# Patient Record
Sex: MALE | Race: WHITE | NOT HISPANIC OR LATINO | Employment: OTHER | URBAN - METROPOLITAN AREA
[De-identification: names, ages, dates, MRNs, and addresses within clinical notes are randomized per-mention and may not be internally consistent; named-entity substitution may affect disease eponyms.]

---

## 2024-05-29 ENCOUNTER — OFFICE VISIT (OUTPATIENT)
Dept: CARDIOLOGY | Facility: CLINIC | Age: 74
End: 2024-05-29
Payer: MEDICARE

## 2024-05-29 ENCOUNTER — TELEPHONE (OUTPATIENT)
Age: 74
End: 2024-05-29
Payer: MEDICARE

## 2024-05-29 VITALS
WEIGHT: 140 LBS | HEIGHT: 70 IN | BODY MASS INDEX: 20.04 KG/M2 | SYSTOLIC BLOOD PRESSURE: 132 MMHG | OXYGEN SATURATION: 99 % | DIASTOLIC BLOOD PRESSURE: 80 MMHG | HEART RATE: 60 BPM

## 2024-05-29 DIAGNOSIS — R55 SYNCOPE AND COLLAPSE: Primary | ICD-10-CM

## 2024-05-29 DIAGNOSIS — I45.2 BIFASCICULAR BLOCK: ICD-10-CM

## 2024-05-29 DIAGNOSIS — I45.5 SINUS PAUSE: ICD-10-CM

## 2024-05-29 PROCEDURE — 1160F RVW MEDS BY RX/DR IN RCRD: CPT | Performed by: STUDENT IN AN ORGANIZED HEALTH CARE EDUCATION/TRAINING PROGRAM

## 2024-05-29 PROCEDURE — 1159F MED LIST DOCD IN RCRD: CPT | Performed by: STUDENT IN AN ORGANIZED HEALTH CARE EDUCATION/TRAINING PROGRAM

## 2024-05-29 RX ORDER — PHENOL 1.4 %
1 AEROSOL, SPRAY (ML) MUCOUS MEMBRANE DAILY
COMMUNITY

## 2024-05-29 RX ORDER — LITHIUM CARBONATE 300 MG/1
300 TABLET, FILM COATED, EXTENDED RELEASE ORAL DAILY
COMMUNITY
Start: 2024-03-18

## 2024-05-29 RX ORDER — CYANOCOBALAMIN 1000 UG/ML
1000 INJECTION, SOLUTION INTRAMUSCULAR; SUBCUTANEOUS
COMMUNITY
Start: 2023-12-07

## 2024-05-29 RX ORDER — NIFEDIPINE 60 MG/1
60 TABLET, EXTENDED RELEASE ORAL DAILY
COMMUNITY
Start: 2023-12-06

## 2024-05-29 RX ORDER — FERROUS SULFATE 325(65) MG
1 TABLET ORAL
COMMUNITY
Start: 2024-03-28

## 2024-05-29 RX ORDER — TAMSULOSIN HYDROCHLORIDE 0.4 MG/1
0.4 CAPSULE ORAL DAILY
COMMUNITY
Start: 2024-02-06

## 2024-05-29 RX ORDER — METOPROLOL SUCCINATE 25 MG/1
25 TABLET, EXTENDED RELEASE ORAL DAILY
COMMUNITY
Start: 2024-02-06 | End: 2025-02-05

## 2024-05-29 RX ORDER — DESVENLAFAXINE SUCCINATE 50 MG/1
50 TABLET, EXTENDED RELEASE ORAL DAILY
Status: ON HOLD | COMMUNITY
End: 2024-05-30

## 2024-05-29 NOTE — PROGRESS NOTES
Subjective:     Encounter Date:05/29/2024      Patient ID: Victor M Grove is a 73 y.o. male.    Chief Complaint:  Syncope, abnormal Holter monitor    HPI:   73 y.o. male with hypertension and CKD who presents for initial evaluation of syncope and abnormal Holter monitor.  Patient was admitted to [facility] in late April after multiple episodes of syncope.  At that time, there was concern for possible temporal lobe focal epilepsy.  Plan was to transfer to another hospital for EEG monitoring but patient refused.  He underwent echocardiogram during that admission and this was normal with an EF of 55 to 60%.  He was discharged home with a Holter monitor and this revealed occasional PVCs with an 11.6-second pause.  With respect to his symptoms, patient notes daily episodes of dizziness.  He has not had any further episodes of syncope since his admission last month.  With respect to his current medication regimen, he notes that he has been on Toprol and nifedipine for quite some time.    The following portions of the patient's history were reviewed and updated as appropriate: allergies, current medications, past family history, past medical history, past social history, past surgical history and problem list.     REVIEW OF SYSTEMS:   All systems reviewed.  Pertinent positives identified in HPI.  All other systems are negative.    Past Medical History:   Diagnosis Date    Cancer     Hypertension     Kidney failure        Family History   Problem Relation Age of Onset    Hypertension Mother     Hypertension Father     Heart attack Father     Heart attack Brother     Coronary artery disease Brother     Cancer Brother     Diabetes Brother     Aneurysm Paternal Uncle     Cancer Maternal Grandmother        Social History     Socioeconomic History    Marital status:    Tobacco Use    Smoking status: Former     Current packs/day: 0.00     Average packs/day: 1 pack/day for 43.0 years (43.0 ttl pk-yrs)      Types: Cigarettes     Start date: 1971     Quit date: 2014     Years since quitting: 10.4     Passive exposure: Never    Smokeless tobacco: Never   Vaping Use    Vaping status: Never Used   Substance and Sexual Activity    Alcohol use: Not Currently    Drug use: Never       Allergies   Allergen Reactions    Nitroglycerin Other (See Comments)     Other reaction(s): Loss of Consciousness    Other reaction(s): Loss of Consciousness   Other reaction(s): Loss of Consciousness   Other reaction(s): Loss of Consciousness       Past Surgical History:   Procedure Laterality Date    APPENDECTOMY      HERNIA REPAIR      SMALL BOWEL EMBOLIZATION         Procedures       Objective:         Vitals:    05/29/24 1331   BP: 132/80   Pulse: 60   SpO2: 99%       PHYSICAL EXAM:  GEN: well appearing, in NAD   HEENT: NCAT, EOMI, moist mucus membranes   Respiratory: CTAB, no wheezes, rales or rhonchi  CV: normal rate, regular rhythm, normal S1, S2, no murmurs, rubs, gallops, +2 radial pulses b/l  GI: soft, nontender, nondistended  MSK: no edema  Skin: no rash, warm, dry  Heme/Lymph: no bruising or bleeding  Neuro: Alert and Oriented x 3, grossly normal motor function        Assessment:         (R55) Syncope and collapse    (I45.5) Sinus pause    73 y.o. male with hypertension and CKD who presents for initial evaluation of syncope and abnormal Holter monitor.       Plan:       #Syncope/sinus pause  Patient with syncope likely secondary to sinus pauses given his Holter findings as well as his symptoms.  I urged patient and family to go to the ED today with plans for telemonitoring with discontinuation of his Toprol and nifedipine as well as possible pacemaker implantation however patient has refused this multiple times.  I spoke to Dr. Palm who agreed that this would be the best course however as patient has refused, we will instead plan for pacemaker implantation tomorrow as an outpatient.  Patient understands that he will likely require  overnight stay in the hospital and he and his family are agreeable.  He is to discontinue his metoprolol and nifedipine.  He will call me if his blood pressure increases significantly.  He will present to the emergency room if he has another episode of syncope.  He will not drive or operate machinery.  - DC metoprolol and nifedipine  - He will be contacted by our EP office for further details regarding pacemaker plantation tomorrow      VICKIE Alejandro, thank you very much for referring this kind patient to me. Please call me with any questions or concerns. I will see the patient again in the office in 6 weeks or earlier as needed.         Jairo Mclain MD, Merged with [facility], [facility]  05/29/24  [facility]    Outpatient Encounter Medications as of 5/29/2024   Medication Sig Dispense Refill    Cholecalciferol 50 MCG (2000 UT) tablet Take 2 tablets by mouth Daily.      cyanocobalamin 1000 MCG/ML injection Inject 1 mL into the appropriate muscle as directed by prescriber Every 30 (Thirty) Days.      desvenlafaxine (PRISTIQ) 50 MG 24 hr tablet Take 1 tablet by mouth Daily.      ferrous sulfate 325 (65 FE) MG tablet Take 1 tablet by mouth Daily With Breakfast.      lithium (LITHOBID) 300 MG CR tablet Take 1 tablet by mouth Daily.      Melatonin 10 MG tablet Take 1 tablet by mouth Daily.      metoprolol succinate XL (TOPROL-XL) 25 MG 24 hr tablet Take 1 tablet by mouth Daily.      NIFEdipine XL (PROCARDIA XL) 60 MG 24 hr tablet Take 1 tablet by mouth Daily.      tamsulosin (FLOMAX) 0.4 MG capsule 24 hr capsule Take 1 capsule by mouth Daily.       No facility-administered encounter medications on file as of 5/29/2024.

## 2024-05-29 NOTE — TELEPHONE ENCOUNTER
Dr. De La O and I called and spoke with patient and wife--discussed recommendations of pacemaker, he is agreeable to come in tomorrow for procedure--creat 4.5---he currently has no fistula and is not on dialysis    Dr. De La O will see tomorrow prior to procedure and decide on transvenous vs leadless and discuss with patient at that time.     He has been instructed to be NPO after midnight and to be here at 2 pm

## 2024-05-30 ENCOUNTER — HOSPITAL ENCOUNTER (OUTPATIENT)
Facility: HOSPITAL | Age: 74
Discharge: HOME | End: 2024-05-31
Attending: INTERNAL MEDICINE | Admitting: INTERNAL MEDICINE
Payer: MEDICARE

## 2024-05-30 DIAGNOSIS — I45.2 BIFASCICULAR BLOCK: ICD-10-CM

## 2024-05-30 DIAGNOSIS — I45.5 SINUS PAUSE: ICD-10-CM

## 2024-05-30 DIAGNOSIS — R55 SYNCOPE AND COLLAPSE: ICD-10-CM

## 2024-05-30 PROCEDURE — C1894 INTRO/SHEATH, NON-LASER: HCPCS | Performed by: INTERNAL MEDICINE

## 2024-05-30 PROCEDURE — 93010 ELECTROCARDIOGRAM REPORT: CPT | Performed by: INTERNAL MEDICINE

## 2024-05-30 PROCEDURE — 25010000002 HEPARIN (PORCINE) PER 1000 UNITS: Performed by: INTERNAL MEDICINE

## 2024-05-30 PROCEDURE — A9270 NON-COVERED ITEM OR SERVICE: HCPCS | Performed by: PHYSICIAN ASSISTANT

## 2024-05-30 PROCEDURE — 33274 TCAT INSJ/RPL PERM LDLS PM: CPT | Performed by: INTERNAL MEDICINE

## 2024-05-30 PROCEDURE — 63710000001 TAMSULOSIN 0.4 MG CAPSULE: Performed by: PHYSICIAN ASSISTANT

## 2024-05-30 PROCEDURE — 63710000001 POLYETHYLENE GLYCOL 17 G PACK: Performed by: PHYSICIAN ASSISTANT

## 2024-05-30 PROCEDURE — G0378 HOSPITAL OBSERVATION PER HR: HCPCS

## 2024-05-30 PROCEDURE — 93005 ELECTROCARDIOGRAM TRACING: CPT | Performed by: INTERNAL MEDICINE

## 2024-05-30 PROCEDURE — 63710000001 METOPROLOL SUCCINATE XL 25 MG TABLET SUSTAINED-RELEASE 24 HOUR: Performed by: PHYSICIAN ASSISTANT

## 2024-05-30 PROCEDURE — 25010000002 MIDAZOLAM PER 1 MG: Performed by: INTERNAL MEDICINE

## 2024-05-30 PROCEDURE — C1786 PMKR, SINGLE, RATE-RESP: HCPCS | Performed by: INTERNAL MEDICINE

## 2024-05-30 PROCEDURE — C1769 GUIDE WIRE: HCPCS | Performed by: INTERNAL MEDICINE

## 2024-05-30 PROCEDURE — 25010000002 VANCOMYCIN 1 G RECONSTITUTED SOLUTION 1 EACH VIAL: Performed by: INTERNAL MEDICINE

## 2024-05-30 PROCEDURE — 63710000001 NIFEDIPINE XL 60 MG TABLET SUSTAINED-RELEASE 24 HOUR: Performed by: PHYSICIAN ASSISTANT

## 2024-05-30 PROCEDURE — 63710000001 MELATONIN 5 MG TABLET: Performed by: PHYSICIAN ASSISTANT

## 2024-05-30 PROCEDURE — C1893 INTRO/SHEATH, FIXED,NON-PEEL: HCPCS | Performed by: INTERNAL MEDICINE

## 2024-05-30 PROCEDURE — 25810000003 SODIUM CHLORIDE 0.9 % SOLUTION: Performed by: INTERNAL MEDICINE

## 2024-05-30 PROCEDURE — 25810000003 SODIUM CHLORIDE 0.9 % SOLUTION 250 ML FLEX CONT: Performed by: INTERNAL MEDICINE

## 2024-05-30 PROCEDURE — 25010000002 FENTANYL CITRATE (PF) 50 MCG/ML SOLUTION: Performed by: INTERNAL MEDICINE

## 2024-05-30 DEVICE — SYS PACE MICRA LD/LESS AV2: Type: IMPLANTABLE DEVICE | Status: FUNCTIONAL

## 2024-05-30 RX ORDER — ACETAMINOPHEN 325 MG/1
650 TABLET ORAL EVERY 4 HOURS PRN
Status: DISCONTINUED | OUTPATIENT
Start: 2024-05-30 | End: 2024-05-31 | Stop reason: HOSPADM

## 2024-05-30 RX ORDER — UREA 10 %
10 LOTION (ML) TOPICAL NIGHTLY
Status: DISCONTINUED | OUTPATIENT
Start: 2024-05-30 | End: 2024-05-31 | Stop reason: HOSPADM

## 2024-05-30 RX ORDER — SODIUM CHLORIDE 9 MG/ML
40 INJECTION, SOLUTION INTRAVENOUS AS NEEDED
Status: DISCONTINUED | OUTPATIENT
Start: 2024-05-30 | End: 2024-05-31 | Stop reason: HOSPADM

## 2024-05-30 RX ORDER — NIFEDIPINE 60 MG/1
60 TABLET, EXTENDED RELEASE ORAL DAILY
Status: DISCONTINUED | OUTPATIENT
Start: 2024-05-30 | End: 2024-05-31 | Stop reason: HOSPADM

## 2024-05-30 RX ORDER — METOPROLOL SUCCINATE 25 MG/1
25 TABLET, EXTENDED RELEASE ORAL DAILY
Status: DISCONTINUED | OUTPATIENT
Start: 2024-05-30 | End: 2024-05-31 | Stop reason: HOSPADM

## 2024-05-30 RX ORDER — METOPROLOL TARTRATE 1 MG/ML
INJECTION, SOLUTION INTRAVENOUS
Status: DISCONTINUED | OUTPATIENT
Start: 2024-05-30 | End: 2024-05-30 | Stop reason: HOSPADM

## 2024-05-30 RX ORDER — SODIUM CHLORIDE 0.9 % (FLUSH) 0.9 %
10 SYRINGE (ML) INJECTION AS NEEDED
Status: DISCONTINUED | OUTPATIENT
Start: 2024-05-30 | End: 2024-05-30 | Stop reason: HOSPADM

## 2024-05-30 RX ORDER — SODIUM CHLORIDE 9 MG/ML
75 INJECTION, SOLUTION INTRAVENOUS CONTINUOUS
Status: DISCONTINUED | OUTPATIENT
Start: 2024-05-30 | End: 2024-05-31

## 2024-05-30 RX ORDER — LITHIUM CARBONATE 300 MG/1
300 TABLET, FILM COATED, EXTENDED RELEASE ORAL DAILY
Status: DISCONTINUED | OUTPATIENT
Start: 2024-05-30 | End: 2024-05-31 | Stop reason: HOSPADM

## 2024-05-30 RX ORDER — MIDAZOLAM HYDROCHLORIDE 1 MG/ML
INJECTION INTRAMUSCULAR; INTRAVENOUS
Status: DISCONTINUED | OUTPATIENT
Start: 2024-05-30 | End: 2024-05-30 | Stop reason: HOSPADM

## 2024-05-30 RX ORDER — FENTANYL CITRATE 50 UG/ML
INJECTION, SOLUTION INTRAMUSCULAR; INTRAVENOUS
Status: DISCONTINUED | OUTPATIENT
Start: 2024-05-30 | End: 2024-05-30 | Stop reason: HOSPADM

## 2024-05-30 RX ORDER — POLYETHYLENE GLYCOL 3350 17 G/17G
17 POWDER, FOR SOLUTION ORAL DAILY
Status: DISCONTINUED | OUTPATIENT
Start: 2024-05-30 | End: 2024-05-31 | Stop reason: HOSPADM

## 2024-05-30 RX ORDER — SODIUM CHLORIDE 0.9 % (FLUSH) 0.9 %
10 SYRINGE (ML) INJECTION EVERY 12 HOURS SCHEDULED
Status: DISCONTINUED | OUTPATIENT
Start: 2024-05-30 | End: 2024-05-31 | Stop reason: HOSPADM

## 2024-05-30 RX ORDER — SODIUM CHLORIDE 0.9 % (FLUSH) 0.9 %
10 SYRINGE (ML) INJECTION AS NEEDED
Status: DISCONTINUED | OUTPATIENT
Start: 2024-05-30 | End: 2024-05-31 | Stop reason: HOSPADM

## 2024-05-30 RX ORDER — HEPARIN SODIUM 1000 [USP'U]/ML
INJECTION, SOLUTION INTRAVENOUS; SUBCUTANEOUS
Status: DISCONTINUED | OUTPATIENT
Start: 2024-05-30 | End: 2024-05-30 | Stop reason: HOSPADM

## 2024-05-30 RX ORDER — ACETAMINOPHEN 650 MG/1
650 SUPPOSITORY RECTAL EVERY 4 HOURS PRN
Status: DISCONTINUED | OUTPATIENT
Start: 2024-05-30 | End: 2024-05-31 | Stop reason: HOSPADM

## 2024-05-30 RX ORDER — DESVENLAFAXINE SUCCINATE 50 MG/1
50 TABLET, EXTENDED RELEASE ORAL DAILY
Status: DISCONTINUED | OUTPATIENT
Start: 2024-05-30 | End: 2024-05-31 | Stop reason: HOSPADM

## 2024-05-30 RX ORDER — TAMSULOSIN HYDROCHLORIDE 0.4 MG/1
0.4 CAPSULE ORAL DAILY
Status: DISCONTINUED | OUTPATIENT
Start: 2024-05-30 | End: 2024-05-31 | Stop reason: HOSPADM

## 2024-05-30 RX ORDER — SODIUM CHLORIDE 0.9 % (FLUSH) 0.9 %
10 SYRINGE (ML) INJECTION EVERY 12 HOURS SCHEDULED
Status: DISCONTINUED | OUTPATIENT
Start: 2024-05-30 | End: 2024-05-30 | Stop reason: HOSPADM

## 2024-05-30 RX ORDER — METHOHEXITAL IN WATER/PF 100MG/10ML
SYRINGE (ML) INTRAVENOUS
Status: DISCONTINUED | OUTPATIENT
Start: 2024-05-30 | End: 2024-05-30 | Stop reason: HOSPADM

## 2024-05-30 RX ADMIN — SODIUM CHLORIDE 75 ML/HR: 9 INJECTION, SOLUTION INTRAVENOUS at 14:36

## 2024-05-30 RX ADMIN — SODIUM CHLORIDE 1000 MG: 0.9 INJECTION, SOLUTION INTRAVENOUS at 14:36

## 2024-05-30 RX ADMIN — POLYETHYLENE GLYCOL 3350 17 G: 17 POWDER, FOR SOLUTION ORAL at 18:43

## 2024-05-30 RX ADMIN — METOPROLOL SUCCINATE 25 MG: 25 TABLET, EXTENDED RELEASE ORAL at 18:34

## 2024-05-30 RX ADMIN — Medication 10 MG: at 21:50

## 2024-05-30 RX ADMIN — TAMSULOSIN HYDROCHLORIDE 0.4 MG: 0.4 CAPSULE ORAL at 18:34

## 2024-05-30 RX ADMIN — Medication 10 ML: at 21:50

## 2024-05-30 RX ADMIN — NIFEDIPINE 60 MG: 60 TABLET, FILM COATED, EXTENDED RELEASE ORAL at 18:35

## 2024-05-30 NOTE — Clinical Note
Hemostasis started on the right femoral vein. Figure 8 suturing was used in achieving hemostasis. Closure device deployed in the vessel. Hemostasis achieved successfully. Closure device additional comment: Sheath removed by M D with figure of 8 stitch and stopcock bolster.

## 2024-05-30 NOTE — DISCHARGE INSTRUCTIONS
Micra Pacemaker Femoral Approach, Adult, Post Care Instructions    This sheet gives you information about how to care for yourself after your procedure. Your health care provider may also give you more specific instructions. If you have problems or questions, contact your health care provider.  What can I expect after the procedure?  After the procedure, it is common to have:  Mild pain.  Slight bruising at your groin site. Minor discomfort or tenderness and a small bump at the catheter insertion site. The bump should usually decrease in size and tenderness within 1 to 2 weeks.  Any bruising will usually fade within 2 to 4 weeks.  Follow these instructions at home:  Home Care Instructions:  Do not apply powder or lotion to the site.  Do not take baths, swim, or use a hot tub until your health care provider approves and the site is completely healed.  Do not bend, squat, or lift anything over 20 lb (9 kg) or as directed by your health care provider. However, we recommend lifting nothing heavier than a gallon of milk.    You may shower 24 hours after the procedure. Remove the bandage (dressing) and gently wash the site with plain soap and water. Gently pat the site dry. You may apply a band aid daily for 2 days if desired.    Inspect the site at least twice daily.  Limit your activity for the first 48 hours. .    Avoid strenuous activity for 1 week or as advised by your physician.    Follow instructions about when you can drive or return to work as directed by your physician.    Hold direct pressure over the site when you cough, sneeze, laugh or change positions.  Do this for the next 2 days.    Do not operate machinery or power tools for 24 hours.  A responsible adult should be with you for the first 24 hours after you arrive home. Do not make any important legal decisions or sign legal papers for 24 hours.  Do not drink alcohol for 24 hours.    Electricity and magnetic fields  Avoid places or objects that have a  strong electric or magnetic field, including:  Airport security checkpoints. When at the airport, let officials know that you have a pacemaker. Carry your pacemaker ID card.  Metal detectors. If you must pass through a metal detector, walk through it quickly. Do not stop under the detector or stand near it.  Power plants.  Large electrical generators.  Radiofrequency transmission towers, such as mobile phone and radio towers.  Do not use amateur radio equipment or electric welding torches. If you are unsure of whether something is safe to use, ask your health care provider. Some devices may be safe to use if you hold them at least 1 ft (0.3 m) from your pacemaker. These devices may include power tools, lawn mowers, and speakers.  Long-term care  You may be shown how to transfer data from your pacemaker through the phone to your health care provider.  Always let all health care providers, including dentists, know about your pacemaker before you have any medical procedures or tests.  Wear a medical ID bracelet or necklace stating that you have a pacemaker.  Carry a pacemaker ID card with you at all times.  Your pacemaker battery will last for 5-15 years. Your health care provider will do routine checks to know when the battery is starting to run down. When this happens, the pacemaker will need to be replaced.          Call Your Doctor If:  You have heavy bleeding from the site. If this happens, hold pressure on the site and call 911.  You have drainage (other than a small amount of blood on the dressing).  You have chills or a fever > 101.  You have redness, warmth, swelling(larger than a walnut), or pain at the insertion site  You develop chest pain or shortness of breath, feel faint, or pass out.  You develop pain, discoloration, coldness, numbness, tingling, or severe bruising in the leg that held the catheter.    You gain weight suddenly.  Your legs or feet swell.  It feels like your heart is fluttering or  skipping beats (you have heart palpitations).  You have any of these signs of infection:  More redness, swelling, or pain around an incision.  Fluid or blood coming from an incision.  Warmth coming from an incision.  Pus or a bad smell coming from an incision.  A fever or chills.       You have any symptoms of a stroke.  Remember BE FAST  B-balance. Signs are dizziness, sudden trouble walking or loss of balance.  E-eyes.  Signs are trouble seeing or a sudden change in vision.   F-face. Signs are sudden weakness or numbness of the face, or the face or eyelid drooping on one side.  A-arms Signs are weakness or numbness in an arm.  This happens suddenly and usually on one side of the body.  S-speech.  Signs are sudden trouble speaking, slurred speech, or trouble understanding what people say.  T-time. Time to call emergency services.  Write down the symptoms and the time they started.   Other signs of stroke may include:  A sudden, severe headache with no known cause  Nausea or vomiting  Seizure           Medicines  Take over-the-counter and prescription medicines only as told by your health care provider.  If you were prescribed an antibiotic medicine, take it as told by your health care provider. Do not stop taking the antibiotic even if you start to feel better.  If you take Aspirin or blood thinner, be sure to talk to your doctor about when to resume this medication.  Ask your health care provider if the medicine prescribed to you requires you to avoid driving or using machinery.    General instructions  Do not use any products that contain nicotine or tobacco, such as cigarettes, e-cigarettes, and chewing tobacco. These can delay incision healing after surgery. If you need help quitting, ask your health care provider.  Follow instructions from your health care provider about eating or drinking restrictions.  Weigh yourself every day. If you suddenly gain weight, fluid may be building up in your body.  Keep all  follow-up visits as told by your health care provider. This is important. During follow-up visits, your pacemaker will be checked and reprogrammed if necessary.  Get help right away if:  You have chest pain.  You have trouble breathing or are short of breath.  You become extremely tired.  You are light-headed or you faint.  These symptoms may represent a serious problem that is an emergency. Do not wait to see if the symptoms will go away. Get medical help right away. Call your local emergency services (911 in the [place] Do not drive yourself to the hospital.

## 2024-05-30 NOTE — Clinical Note
Prepped: groin. Prepped with: ChloraPrep. The site was clipped. The patient was draped in a sterile fashion. Groins and chest shaved in preop.

## 2024-05-30 NOTE — Clinical Note
A sheath was successfully inserted using micropuncture technique with ultrasound guidance into the right femoral vein.

## 2024-05-31 VITALS
OXYGEN SATURATION: 100 % | TEMPERATURE: 98.2 F | HEIGHT: 70 IN | DIASTOLIC BLOOD PRESSURE: 72 MMHG | BODY MASS INDEX: 21.47 KG/M2 | HEART RATE: 68 BPM | SYSTOLIC BLOOD PRESSURE: 111 MMHG | WEIGHT: 150 LBS | RESPIRATION RATE: 16 BRPM

## 2024-05-31 PROBLEM — R55 SYNCOPE: Status: ACTIVE | Noted: 2024-05-31

## 2024-05-31 LAB
ANION GAP SERPL CALCULATED.3IONS-SCNC: 11 MMOL/L (ref 5–15)
BUN SERPL-MCNC: 57 MG/DL (ref 8–23)
BUN/CREAT SERPL: 13.9 (ref 7–25)
CALCIUM SPEC-SCNC: 9.1 MG/DL (ref 8.6–10.5)
CHLORIDE SERPL-SCNC: 113 MMOL/L (ref 98–107)
CO2 SERPL-SCNC: 21 MMOL/L (ref 22–29)
CREAT SERPL-MCNC: 4.09 MG/DL (ref 0.76–1.27)
DEPRECATED RDW RBC AUTO: 46.9 FL (ref 37–54)
EGFRCR SERPLBLD CKD-EPI 2021: 14.8 ML/MIN/1.73
ERYTHROCYTE [DISTWIDTH] IN BLOOD BY AUTOMATED COUNT: 14.4 % (ref 12.3–15.4)
GLUCOSE SERPL-MCNC: 160 MG/DL (ref 65–99)
HCT VFR BLD AUTO: 35.3 % (ref 37.5–51)
HGB BLD-MCNC: 11.5 G/DL (ref 13–17.7)
MCH RBC QN AUTO: 29 PG (ref 26.6–33)
MCHC RBC AUTO-ENTMCNC: 32.6 G/DL (ref 31.5–35.7)
MCV RBC AUTO: 89.1 FL (ref 79–97)
PLATELET # BLD AUTO: 242 10*3/MM3 (ref 140–450)
PMV BLD AUTO: 9.3 FL (ref 6–12)
POTASSIUM SERPL-SCNC: 4 MMOL/L (ref 3.5–5.2)
QT INTERVAL: 461 MS
QTC INTERVAL: 465 MS
RBC # BLD AUTO: 3.96 10*6/MM3 (ref 4.14–5.8)
SODIUM SERPL-SCNC: 145 MMOL/L (ref 136–145)
WBC NRBC COR # BLD AUTO: 8.86 10*3/MM3 (ref 3.4–10.8)

## 2024-05-31 PROCEDURE — 63710000001 TAMSULOSIN 0.4 MG CAPSULE: Performed by: PHYSICIAN ASSISTANT

## 2024-05-31 PROCEDURE — 93010 ELECTROCARDIOGRAM REPORT: CPT | Performed by: INTERNAL MEDICINE

## 2024-05-31 PROCEDURE — 80048 BASIC METABOLIC PNL TOTAL CA: CPT | Performed by: NURSE PRACTITIONER

## 2024-05-31 PROCEDURE — A9270 NON-COVERED ITEM OR SERVICE: HCPCS | Performed by: PHYSICIAN ASSISTANT

## 2024-05-31 PROCEDURE — G0378 HOSPITAL OBSERVATION PER HR: HCPCS

## 2024-05-31 PROCEDURE — 63710000001 METOPROLOL SUCCINATE XL 25 MG TABLET SUSTAINED-RELEASE 24 HOUR: Performed by: PHYSICIAN ASSISTANT

## 2024-05-31 PROCEDURE — 85027 COMPLETE CBC AUTOMATED: CPT | Performed by: NURSE PRACTITIONER

## 2024-05-31 PROCEDURE — 63710000001 LITHIUM 300 MG TABLET CONTROLLED-RELEASE: Performed by: PHYSICIAN ASSISTANT

## 2024-05-31 PROCEDURE — 99238 HOSP IP/OBS DSCHRG MGMT 30/<: CPT | Performed by: NURSE PRACTITIONER

## 2024-05-31 PROCEDURE — 63710000001 NIFEDIPINE XL 60 MG TABLET SUSTAINED-RELEASE 24 HOUR: Performed by: PHYSICIAN ASSISTANT

## 2024-05-31 PROCEDURE — 93005 ELECTROCARDIOGRAM TRACING: CPT | Performed by: NURSE PRACTITIONER

## 2024-05-31 RX ADMIN — METOPROLOL SUCCINATE 25 MG: 25 TABLET, EXTENDED RELEASE ORAL at 09:49

## 2024-05-31 RX ADMIN — TAMSULOSIN HYDROCHLORIDE 0.4 MG: 0.4 CAPSULE ORAL at 09:49

## 2024-05-31 RX ADMIN — Medication 10 ML: at 09:49

## 2024-05-31 RX ADMIN — LITHIUM CARBONATE 300 MG: 300 TABLET, FILM COATED, EXTENDED RELEASE ORAL at 09:49

## 2024-05-31 RX ADMIN — NIFEDIPINE 60 MG: 60 TABLET, FILM COATED, EXTENDED RELEASE ORAL at 09:49

## 2024-05-31 NOTE — CASE MANAGEMENT/SOCIAL WORK
Discharge Planning Assessment  [facility]     Patient Name: Victor M Grove  MRN: [id number]  Today's Date: 5/31/2024    Admit Date: 5/30/2024    Plan: Home w/ spouse   Discharge Needs Assessment       Row Name 05/31/24 1412       Living Environment    People in Home spouse    Name(s) of People in Home Narcisa Grove/spouse    Current Living Arrangements home    Potentially Unsafe Housing Conditions none    In the past 12 months has the electric, gas, oil, or water company threatened to shut off services in your home? No    Primary Care Provided by self    Provides Primary Care For no one    Family Caregiver if Needed spouse    Quality of Family Relationships supportive;involved    Able to Return to Prior Arrangements yes       Resource/Environmental Concerns    Resource/Environmental Concerns none    Transportation Concerns none       Transportation Needs    In the past 12 months, has lack of transportation kept you from medical appointments or from getting medications? no    In the past 12 months, has lack of transportation kept you from meetings, work, or from getting things needed for daily living? No       Food Insecurity    Within the past 12 months, you worried that your food would run out before you got the money to buy more. Never true       Transition Planning    Patient/Family Anticipates Transition to home with family    Patient/Family Anticipated Services at Transition none    Transportation Anticipated family or friend will provide       Discharge Needs Assessment    Readmission Within the Last 30 Days no previous admission in last 30 days    Equipment Currently Used at Home bp cuff    Concerns to be Addressed no discharge needs identified;denies needs/concerns at this time    Anticipated Changes Related to Illness none    Equipment Needed After Discharge none    Provided Post Acute Provider List? N/A    N/A Provider List Comment no need for list identified at this time                   Discharge  Plan       Row Name 05/31/24 1413       Plan    Plan Home w/ spouse    Plan Comments CCP spoke with patient and spouse/Narcisa Grove at bedside.  CCP role explained and discharge planning discussed.  Face sheet verified.  Patient stated he is IADL's, retired and no longer drives much.  Patient lives with spouse in a single-story home with three entrance stair steps.  Patients PCP confirmed as, ePdro Robles.  Patient's pharmacy confirmed as, [facility] in [place], [place].  Patient has the following DME- BP cuff.  Patient has used home health back in 2014 but cannot recall the agency name.  Pt denies past sub-acute rehab stay.  Patient plans to return home at discharge and wife will transport.  Patient denies current discharge needs.  CCP will continue to follow…….Desiree GARNICA /TIGRE.                  Continued Care and Services - Admitted Since 5/30/2024    No active coordination exists for this encounter.       Expected Discharge Date and Time       Expected Discharge Date Expected Discharge Time    May 31, 2024            Demographic Summary       Row Name 05/31/24 1410       General Information    Admission Type same day    Arrived From home    Referral Source admission list    Reason for Consult discharge planning    Preferred Language English       Contact Information    Permission Granted to Share Info With family/designee                   Functional Status       Row Name 05/31/24 1411       Functional Status    Usual Activity Tolerance moderate    Current Activity Tolerance moderate       Assessment of Health Literacy    How often do you have someone help you read hospital materials? Never    Health Literacy Good       Functional Status, IADL    Medications independent    Meal Preparation independent    Housekeeping independent    Laundry independent    Shopping assistive person       Mental Status    General Appearance WDL WDL       Mental Status Summary    Recent Changes in Mental Status/Cognitive  Functioning no changes    Mental Status Comments flat affect       Employment/    Employment Status retired                   Psychosocial    No documentation.                  Abuse/Neglect    No documentation.                  Legal    No documentation.                  Substance Abuse    No documentation.                  Patient Forms    No documentation.                     Desiree Merino RN

## 2024-05-31 NOTE — DISCHARGE SUMMARY
DISCHARGE NOTE    Patient Name: Victor M Grove  Age/Sex: 73 y.o. male  : 1950  MRN: [id number]    Date of Discharge:  2024   Date of Admit: 2024  Encounter Provider: VICKIE Estrella  Place of Service: [facility] CARDIOLOGY  Patient Care Team:  Pedro Robles APRN as PCP - General (Family Medicine)    Subjective:     Discharge Diagnosis:    Syncope    Syncope and collapse    Sinus pause    Bifascicular block      Hospital Course:     73 yr old who saw Dr. Aldrich in the office for the first time  for recent episodes of dizziness and syncope.     He had syncope in April and was hospitalized at [facility] in [place]---seen by cardiology, had echo which showed normal EF and sent home with a monitor which showed pauses, longest almost 12 seconds in duration.     He saw Dr. Aldrich on , he recommended admission to hospital that day given his ongoing complaints of dizziness and near syncope and abnormal monitoring findings so he could be seen by EP with plans likely for pacemaker the following day. He adamantly refused to be admitted but was agreeable to coming in for PPM .     Dr. De La O and I called and spoke with him about the procedure.     He presented electively , he has significant CKD stage IV --recent creat 4.5, he underwent implantation of Micra leadless pacemaker.    Tolerated procedure well. He  was ready to go home this morning but had not been up out of the bed.    He got up to the bathroom and sneezed multiple times and he has some bleeding from his leg, got back to bed, pressure held by RN and bleeding stopped---no hematoma, new dressing placed. Bedrest for 1 hour, the HOB up and then up to chair.     Monitored until this afternoon, he has been up in the chair and ambulated twice. He reports feeling tired and weak. B/p is okay,  pacemaker checked by rep this morning, normal testing and function.     Checked labs this morning, drawn after his episode of bleeding from his right groin---H/H stable, actually improved from recent labs---11.5/35.3, creat improved a little as well, down to 4.09.     Instructed on holding pressure to right groin if sneeze, cough, lifting, etc. He was able to demonstrate technique back without any issues.     Stable for dc home, incision/pacemaker check in 1 week.     Vital Signs  Temp:  [97.2 °F (36.2 °C)-98.2 °F (36.8 °C)] 98.2 °F (36.8 °C)  Heart Rate:  [49-74] 68  Resp:  [3-19] 16  BP: (111-211)/() 111/72    Intake/Output Summary (Last 24 hours) at 5/31/2024 1451  Last data filed at 5/31/2024 1300  Gross per 24 hour   Intake 1210 ml   Output 760 ml   Net 450 ml       Physical Exam:    General Appearance: No acute distress  Respiratory: No signs of respiratory distress. Respiration rhythm and depth normal.   Cardiovascular:  Heart Rate and Rhythm: Normal   Lower Extremities: No edema noted.  Musculoskeletal: Normal movement of extremities  Skin: Warm and dry.   Psychiatric: Patient alert and oriented to person, place, and time. Flat affect.     Labs:   Results from last 7 days   Lab Units 05/31/24  0838   SODIUM mmol/L 145   POTASSIUM mmol/L 4.0   CHLORIDE mmol/L 113*   CO2 mmol/L 21.0*   BUN mg/dL 57*   CREATININE mg/dL 4.09*   GLUCOSE mg/dL 160*   CALCIUM mg/dL 9.1         Results from last 7 days   Lab Units 05/31/24  0838   WBC 10*3/mm3 8.86   HEMOGLOBIN g/dL 11.5*   HEMATOCRIT % 35.3*   PLATELETS 10*3/mm3 242         Discharge Diet:    Dietary Orders (From admission, onward)       Start     Ordered    05/30/24 1737  Diet: Regular/House; Fluid Consistency: Thin (IDDSI 0)  Diet Effective Now        References:    Diet Order [facility]   Question Answer Comment   Diets: Regular/House    Fluid Consistency: Thin (IDDSI 0)        05/30/24 1736                    Activity at Discharge:  Instructions given to  patient.     Discharge Medications     Discharge Medications        Continue These Medications        Instructions Start Date   Cholecalciferol 50 MCG (2000 UT) tablet   4,000 Units, Oral, Daily      cyanocobalamin 1000 MCG/ML injection   1,000 mcg, Intramuscular, Every 30 Days      ferrous sulfate 325 (65 FE) MG tablet   1 tablet, Oral, Daily With Breakfast      IRON PO   Oral      lithium 300 MG CR tablet  Commonly known as: LITHOBID   300 mg, Oral, Daily      Melatonin 10 MG tablet   1 tablet, Oral, Daily      metoprolol succinate XL 25 MG 24 hr tablet  Commonly known as: TOPROL-XL   25 mg, Oral, Daily      MIRALAX PO   Oral      NIFEdipine XL 60 MG 24 hr tablet  Commonly known as: PROCARDIA XL   60 mg, Oral, Daily      tamsulosin 0.4 MG capsule 24 hr capsule  Commonly known as: FLOMAX   0.4 mg, Oral, Daily               Discharge disposition: home    Follow-up Appointments   Follow-up Information       [facility] Follow up in 1 week(s).    Why: for device interrogaiton  Contact information:  [address]  [place], [address]             Leticia De Jesus APRN Follow up in 6 week(s).    Specialty: Cardiology  Why: 6-8 weeks with pacemaker check  Contact information:  [address]  [address]  [place] [address]  [phone]               Pedro Robles APRN .    Specialty: Family Medicine  Contact information:  [address]  [place] [address] [phone]                           Future Appointments   Date Time Provider Department Center   6/7/2024 10:30 AM [facility] [place] DEVICE CHECK [id number] None         VICKIE Estrelal  05/31/24  14:51 EDT

## 2024-06-03 LAB
QT INTERVAL: 474 MS
QTC INTERVAL: 489 MS

## 2024-06-07 ENCOUNTER — CLINICAL SUPPORT NO REQUIREMENTS (OUTPATIENT)
Age: 74
End: 2024-06-07
Payer: MEDICARE

## 2024-06-07 DIAGNOSIS — I45.5 SINUS PAUSE: Primary | ICD-10-CM

## 2024-06-07 DIAGNOSIS — I45.2 BIFASCICULAR BLOCK: ICD-10-CM

## 2024-06-21 ENCOUNTER — CLINICAL SUPPORT NO REQUIREMENTS (OUTPATIENT)
Age: 74
End: 2024-06-21
Payer: MEDICARE

## 2024-06-21 DIAGNOSIS — I45.5 SINUS PAUSE: ICD-10-CM

## 2024-06-21 DIAGNOSIS — I44.2 AV BLOCK, COMPLETE: Primary | ICD-10-CM

## 2024-07-20 ENCOUNTER — APPOINTMENT (OUTPATIENT)
Dept: CT IMAGING | Facility: HOSPITAL | Age: 74
End: 2024-07-20
Payer: MEDICARE

## 2024-07-20 ENCOUNTER — HOSPITAL ENCOUNTER (EMERGENCY)
Facility: HOSPITAL | Age: 74
Discharge: HOME | End: 2024-07-20
Attending: EMERGENCY MEDICINE
Payer: MEDICARE

## 2024-07-20 VITALS
OXYGEN SATURATION: 99 % | HEIGHT: 70 IN | TEMPERATURE: 99.3 F | DIASTOLIC BLOOD PRESSURE: 88 MMHG | RESPIRATION RATE: 17 BRPM | BODY MASS INDEX: 20.29 KG/M2 | SYSTOLIC BLOOD PRESSURE: 137 MMHG | HEART RATE: 86 BPM | WEIGHT: 141.76 LBS

## 2024-07-20 DIAGNOSIS — K59.00 CONSTIPATION, UNSPECIFIED CONSTIPATION TYPE: ICD-10-CM

## 2024-07-20 DIAGNOSIS — R33.8 ACUTE URINARY RETENTION: Primary | ICD-10-CM

## 2024-07-20 LAB
ALBUMIN SERPL-MCNC: 4.5 G/DL (ref 3.5–5.2)
ALBUMIN/GLOB SERPL: 1.6 G/DL
ALP SERPL-CCNC: 55 U/L (ref 39–117)
ALT SERPL W P-5'-P-CCNC: 7 U/L (ref 1–41)
ANION GAP SERPL CALCULATED.3IONS-SCNC: 12.8 MMOL/L (ref 5–15)
AST SERPL-CCNC: 13 U/L (ref 1–40)
BACTERIA UR QL AUTO: NORMAL /HPF
BASOPHILS # BLD AUTO: 0.02 10*3/MM3 (ref 0–0.2)
BASOPHILS NFR BLD AUTO: 0.2 % (ref 0–1.5)
BILIRUB SERPL-MCNC: 0.3 MG/DL (ref 0–1.2)
BILIRUB UR QL STRIP: NEGATIVE
BUN SERPL-MCNC: 62 MG/DL (ref 8–23)
BUN/CREAT SERPL: 12.8 (ref 7–25)
CALCIUM SPEC-SCNC: 10.3 MG/DL (ref 8.6–10.5)
CHLORIDE SERPL-SCNC: 107 MMOL/L (ref 98–107)
CLARITY UR: CLEAR
CO2 SERPL-SCNC: 19.2 MMOL/L (ref 22–29)
COLOR UR: YELLOW
CREAT SERPL-MCNC: 4.86 MG/DL (ref 0.76–1.27)
D-LACTATE SERPL-SCNC: 1.5 MMOL/L (ref 0.5–2)
DEPRECATED RDW RBC AUTO: 48 FL (ref 37–54)
EGFRCR SERPLBLD CKD-EPI 2021: 12 ML/MIN/1.73
EOSINOPHIL # BLD AUTO: 0 10*3/MM3 (ref 0–0.4)
EOSINOPHIL NFR BLD AUTO: 0 % (ref 0.3–6.2)
ERYTHROCYTE [DISTWIDTH] IN BLOOD BY AUTOMATED COUNT: 14.6 % (ref 12.3–15.4)
GLOBULIN UR ELPH-MCNC: 2.9 GM/DL
GLUCOSE SERPL-MCNC: 153 MG/DL (ref 65–99)
GLUCOSE UR STRIP-MCNC: NEGATIVE MG/DL
HCT VFR BLD AUTO: 33.8 % (ref 37.5–51)
HGB BLD-MCNC: 10.7 G/DL (ref 13–17.7)
HGB UR QL STRIP.AUTO: NEGATIVE
HOLD SPECIMEN: NORMAL
HOLD SPECIMEN: NORMAL
HYALINE CASTS UR QL AUTO: NORMAL /LPF
IMM GRANULOCYTES # BLD AUTO: 0.06 10*3/MM3 (ref 0–0.05)
IMM GRANULOCYTES NFR BLD AUTO: 0.5 % (ref 0–0.5)
KETONES UR QL STRIP: NEGATIVE
LEUKOCYTE ESTERASE UR QL STRIP.AUTO: NEGATIVE
LIPASE SERPL-CCNC: 32 U/L (ref 13–60)
LYMPHOCYTES # BLD AUTO: 0.36 10*3/MM3 (ref 0.7–3.1)
LYMPHOCYTES NFR BLD AUTO: 2.9 % (ref 19.6–45.3)
MCH RBC QN AUTO: 28.3 PG (ref 26.6–33)
MCHC RBC AUTO-ENTMCNC: 31.7 G/DL (ref 31.5–35.7)
MCV RBC AUTO: 89.4 FL (ref 79–97)
MONOCYTES # BLD AUTO: 0.44 10*3/MM3 (ref 0.1–0.9)
MONOCYTES NFR BLD AUTO: 3.5 % (ref 5–12)
NEUTROPHILS NFR BLD AUTO: 11.65 10*3/MM3 (ref 1.7–7)
NEUTROPHILS NFR BLD AUTO: 92.9 % (ref 42.7–76)
NITRITE UR QL STRIP: NEGATIVE
NRBC BLD AUTO-RTO: 0 /100 WBC (ref 0–0.2)
PH UR STRIP.AUTO: 6.5 [PH] (ref 5–8)
PLATELET # BLD AUTO: 345 10*3/MM3 (ref 140–450)
PMV BLD AUTO: 9.2 FL (ref 6–12)
POTASSIUM SERPL-SCNC: 4.6 MMOL/L (ref 3.5–5.2)
PROT SERPL-MCNC: 7.4 G/DL (ref 6–8.5)
PROT UR QL STRIP: ABNORMAL
RBC # BLD AUTO: 3.78 10*6/MM3 (ref 4.14–5.8)
RBC # UR STRIP: NORMAL /HPF
REF LAB TEST METHOD: NORMAL
SODIUM SERPL-SCNC: 139 MMOL/L (ref 136–145)
SP GR UR STRIP: 1.01 (ref 1–1.03)
SQUAMOUS #/AREA URNS HPF: NORMAL /HPF
UROBILINOGEN UR QL STRIP: ABNORMAL
WBC # UR STRIP: NORMAL /HPF
WBC NRBC COR # BLD AUTO: 12.53 10*3/MM3 (ref 3.4–10.8)
WHOLE BLOOD HOLD COAG: NORMAL
WHOLE BLOOD HOLD SPECIMEN: NORMAL

## 2024-07-20 PROCEDURE — 85025 COMPLETE CBC W/AUTO DIFF WBC: CPT

## 2024-07-20 PROCEDURE — 99284 EMERGENCY DEPT VISIT MOD MDM: CPT

## 2024-07-20 PROCEDURE — 81001 URINALYSIS AUTO W/SCOPE: CPT | Performed by: EMERGENCY MEDICINE

## 2024-07-20 PROCEDURE — 80053 COMPREHEN METABOLIC PANEL: CPT

## 2024-07-20 PROCEDURE — 51798 US URINE CAPACITY MEASURE: CPT

## 2024-07-20 PROCEDURE — 96374 THER/PROPH/DIAG INJ IV PUSH: CPT

## 2024-07-20 PROCEDURE — 83605 ASSAY OF LACTIC ACID: CPT

## 2024-07-20 PROCEDURE — 83690 ASSAY OF LIPASE: CPT

## 2024-07-20 PROCEDURE — 51702 INSERT TEMP BLADDER CATH: CPT

## 2024-07-20 PROCEDURE — 74176 CT ABD & PELVIS W/O CONTRAST: CPT

## 2024-07-20 PROCEDURE — 25010000002 MORPHINE PER 10 MG: Performed by: EMERGENCY MEDICINE

## 2024-07-20 PROCEDURE — 36415 COLL VENOUS BLD VENIPUNCTURE: CPT

## 2024-07-20 RX ORDER — SODIUM CHLORIDE 0.9 % (FLUSH) 0.9 %
10 SYRINGE (ML) INJECTION AS NEEDED
Status: DISCONTINUED | OUTPATIENT
Start: 2024-07-20 | End: 2024-07-20 | Stop reason: HOSPADM

## 2024-07-20 RX ORDER — MORPHINE SULFATE 2 MG/ML
2 INJECTION, SOLUTION INTRAMUSCULAR; INTRAVENOUS ONCE
Status: COMPLETED | OUTPATIENT
Start: 2024-07-20 | End: 2024-07-20

## 2024-07-20 RX ADMIN — MORPHINE SULFATE 2 MG: 2 INJECTION, SOLUTION INTRAMUSCULAR; INTRAVENOUS at 15:10

## 2024-07-20 NOTE — ED PROVIDER NOTES
Time: 2:19 PM EDT  Date of encounter:  7/20/2024  Independent Historian/Clinical History and Information was obtained by:   Patient and Family    History is limited by: N/A    Chief Complaint: Abdominal pain      History of Present Illness:  Patient is a 73 y.o. year old male who presents to the emergency department for evaluation of abdominal pain.  Patient reports constipation and urinary retention for at least 1 to 2 days.  Does also state a history of stage IV CKD but is not on dialysis.  Reportedly still makes urine.    HPI    Patient Care Team  Primary Care Provider: Pedro Robles APRN    Past Medical History:     Allergies   Allergen Reactions    Nitroglycerin Other (See Comments)     Other reaction(s): Loss of Consciousness    Other reaction(s): Loss of Consciousness   Other reaction(s): Loss of Consciousness   Other reaction(s): Loss of Consciousness     Past Medical History:   Diagnosis Date    Cancer     Squamous cell skin    Cancer of kidney     Hypertension     Kidney failure      Past Surgical History:   Procedure Laterality Date    APPENDECTOMY      HERNIA REPAIR      NEPHRECTOMY Left 2014    PACEMAKER IMPLANTATION N/A 5/30/2024    Procedure: Implant or Replacement of Single Chamber Leadless Pacemaker, RV Only;  Surgeon: Ramirez De La O MD;  Location: [facility] INVASIVE LOCATION;  Service: Cardiovascular;  Laterality: N/A;    SMALL BOWEL EMBOLIZATION      TURP / TRANSURETHRAL INCISION / DRAINAGE PROSTATE       Family History   Problem Relation Age of Onset    Hypertension Mother     Hypertension Father     Heart attack Father     Heart attack Brother     Coronary artery disease Brother     Cancer Brother     Diabetes Brother     Aneurysm Paternal Uncle     Cancer Maternal Grandmother        Home Medications:  Prior to Admission medications    Medication Sig Start Date End Date Taking? Authorizing Provider   Cholecalciferol 50 MCG (2000 UT) tablet Take 2 tablets by mouth Daily.    Provider,  MD Josh   cyanocobalamin 1000 MCG/ML injection Inject 1 mL into the appropriate muscle as directed by prescriber Every 30 (Thirty) Days. 12/7/23   Josh Montoya MD   Ferrous Sulfate (IRON PO) Take  by mouth.    Josh Montoya MD   ferrous sulfate 325 (65 FE) MG tablet Take 1 tablet by mouth Daily With Breakfast. 3/28/24   Josh Montoya MD   lithium (LITHOBID) 300 MG CR tablet Take 1 tablet by mouth Daily. 3/18/24   Josh Montoya MD   Melatonin 10 MG tablet Take 1 tablet by mouth Daily.    Josh Montoya MD   metoprolol succinate XL (TOPROL-XL) 25 MG 24 hr tablet Take 1 tablet by mouth Daily. 2/6/24 2/5/25  Josh Montoya MD   NIFEdipine XL (PROCARDIA XL) 60 MG 24 hr tablet Take 1 tablet by mouth Daily. 12/6/23   Josh Montoya MD   Polyethylene Glycol 3350 (MIRALAX PO) Take  by mouth.    Josh Montoya MD   tamsulosin (FLOMAX) 0.4 MG capsule 24 hr capsule Take 1 capsule by mouth Daily. 2/6/24   Josh Montoya MD        Social History:   Social History     Tobacco Use    Smoking status: Former     Current packs/day: 0.00     Average packs/day: 1 pack/day for 43.0 years (43.0 ttl pk-yrs)     Types: Cigarettes     Start date: 1971     Quit date: 2014     Years since quitting: 10.5     Passive exposure: Never    Smokeless tobacco: Never   Vaping Use    Vaping status: Never Used   Substance Use Topics    Alcohol use: Not Currently    Drug use: Never         Review of Systems:  Review of Systems   Constitutional:  Negative for chills and fever.   HENT:  Negative for congestion, rhinorrhea and sore throat.    Eyes:  Negative for pain and visual disturbance.   Respiratory:  Negative for apnea, cough, chest tightness and shortness of breath.    Cardiovascular:  Negative for chest pain and palpitations.   Gastrointestinal:  Positive for abdominal pain and constipation. Negative for diarrhea, nausea and vomiting.   Genitourinary:  Positive for urgency.  "Negative for difficulty urinating and dysuria.   Musculoskeletal:  Negative for joint swelling and myalgias.   Skin:  Negative for color change.   Neurological:  Negative for seizures and headaches.   Psychiatric/Behavioral: Negative.     All other systems reviewed and are negative.       Physical Exam:  /88 (BP Location: Left arm, Patient Position: Sitting)   Pulse 86   Temp 99.3 °F (37.4 °C) (Oral)   Resp 17   Ht 177.8 cm (70\")   Wt 64.3 kg (141 lb 12.1 oz)   SpO2 99%   BMI 20.34 kg/m²     Physical Exam  Vitals and nursing note reviewed.   Constitutional:       General: He is not in acute distress.     Appearance: Normal appearance. He is not toxic-appearing.   HENT:      Head: Normocephalic and atraumatic.      Jaw: There is normal jaw occlusion.   Eyes:      General: Lids are normal.      Extraocular Movements: Extraocular movements intact.      Conjunctiva/sclera: Conjunctivae normal.      Pupils: Pupils are equal, round, and reactive to light.   Cardiovascular:      Rate and Rhythm: Normal rate and regular rhythm.      Pulses: Normal pulses.      Heart sounds: Normal heart sounds.   Pulmonary:      Effort: Pulmonary effort is normal. No respiratory distress.      Breath sounds: Normal breath sounds. No wheezing or rhonchi.   Abdominal:      General: There is distension (Suprapubic, infraumbilical).      Palpations: Abdomen is soft.      Tenderness: There is abdominal tenderness. There is no guarding or rebound.   Musculoskeletal:         General: Normal range of motion.      Cervical back: Normal range of motion and neck supple.      Right lower leg: No edema.      Left lower leg: No edema.   Skin:     General: Skin is warm and dry.   Neurological:      Mental Status: He is alert and oriented to person, place, and time. Mental status is at baseline.   Psychiatric:         Mood and Affect: Mood normal.                  Procedures:  Procedures      Medical Decision Making:      Comorbidities that " affect care:    Chronic Kidney Disease    External Notes reviewed:    Previous Clinic Note: Nephrology office visit for CKD management      The following orders were placed and all results were independently analyzed by me:  Orders Placed This Encounter   Procedures    CT Abdomen Pelvis Without Contrast    Fence Draw    Comprehensive Metabolic Panel    Lipase    Urinalysis With Microscopic If Indicated (No Culture) - Urine, Clean Catch    Lactic Acid, Plasma    CBC Auto Differential    Urinalysis, Microscopic Only - Urine, Clean Catch    NPO Diet NPO Type: Strict NPO    Undress & Gown    Insert Indwelling Urinary Catheter    Assess Need for Indwelling Urinary Catheter - Follow Removal Protocol    Urinary Catheter Care    Bladder scan    Insert Peripheral IV    CBC & Differential    Green Top (Gel)    Lavender Top    Gold Top - SST    Light Blue Top       Medications Given in the Emergency Department:  Medications   sodium chloride 0.9 % flush 10 mL (has no administration in time range)   morphine injection 2 mg (2 mg Intravenous Given 7/20/24 1510)        ED Course:         Labs:    Lab Results (last 24 hours)       Procedure Component Value Units Date/Time    CBC & Differential [[id number]]  (Abnormal) Collected: 07/20/24 1208    Specimen: Blood Updated: 07/20/24 1217    Narrative:      The following orders were created for panel order CBC & Differential.  Procedure                               Abnormality         Status                     ---------                               -----------         ------                     CBC Auto Differential[[id number]]        Abnormal            Final result                 Please view results for these tests on the individual orders.    Comprehensive Metabolic Panel [[id number]]  (Abnormal) Collected: 07/20/24 1208    Specimen: Blood Updated: 07/20/24 1237     Glucose 153 mg/dL      BUN 62 mg/dL      Creatinine 4.86 mg/dL      Sodium 139 mmol/L      Potassium 4.6 mmol/L       Chloride 107 mmol/L      CO2 19.2 mmol/L      Calcium 10.3 mg/dL      Total Protein 7.4 g/dL      Albumin 4.5 g/dL      ALT (SGPT) 7 U/L      AST (SGOT) 13 U/L      Alkaline Phosphatase 55 U/L      Total Bilirubin 0.3 mg/dL      Globulin 2.9 gm/dL      A/G Ratio 1.6 g/dL      BUN/Creatinine Ratio 12.8     Anion Gap 12.8 mmol/L      eGFR 12.0 mL/min/1.73      Comment: <15 Indicative of kidney failure       Narrative:      GFR Normal >60  Chronic Kidney Disease <60  Kidney Failure <15    The GFR formula is only valid for adults with stable renal function between ages 18 and 70.    Lipase [[id number]]  (Normal) Collected: 07/20/24 1208    Specimen: Blood Updated: 07/20/24 1237     Lipase 32 U/L     Lactic Acid, Plasma [[id number]]  (Normal) Collected: 07/20/24 1208    Specimen: Blood Updated: 07/20/24 1229     Lactate 1.5 mmol/L     CBC Auto Differential [[id number]]  (Abnormal) Collected: 07/20/24 1208    Specimen: Blood Updated: 07/20/24 1217     WBC 12.53 10*3/mm3      RBC 3.78 10*6/mm3      Hemoglobin 10.7 g/dL      Hematocrit 33.8 %      MCV 89.4 fL      MCH 28.3 pg      MCHC 31.7 g/dL      RDW 14.6 %      RDW-SD 48.0 fl      MPV 9.2 fL      Platelets 345 10*3/mm3      Neutrophil % 92.9 %      Lymphocyte % 2.9 %      Monocyte % 3.5 %      Eosinophil % 0.0 %      Basophil % 0.2 %      Immature Grans % 0.5 %      Neutrophils, Absolute 11.65 10*3/mm3      Lymphocytes, Absolute 0.36 10*3/mm3      Monocytes, Absolute 0.44 10*3/mm3      Eosinophils, Absolute 0.00 10*3/mm3      Basophils, Absolute 0.02 10*3/mm3      Immature Grans, Absolute 0.06 10*3/mm3      nRBC 0.0 /100 WBC     Urinalysis With Microscopic If Indicated (No Culture) - Indwelling Urethral Catheter [[id number]]  (Abnormal) Collected: 07/20/24 1507    Specimen: Urine from Indwelling Urethral Catheter Updated: 07/20/24 1549     Color, UA Yellow     Appearance, UA Clear     pH, UA 6.5     Specific Gravity, UA 1.007     Glucose, UA Negative     Ketones, UA  Negative     Bilirubin, UA Negative     Blood, UA Negative     Protein, UA >=300 mg/dL (3+)     Leuk Esterase, UA Negative     Nitrite, UA Negative     Urobilinogen, UA 0.2 E.U./dL    Urinalysis, Microscopic Only - Indwelling Urethral Catheter [[id number]] Collected: 07/20/24 1507    Specimen: Urine from Indwelling Urethral Catheter Updated: 07/20/24 1549     RBC, UA 0-2 /HPF      WBC, UA 0-2 /HPF      Bacteria, UA None Seen /HPF      Squamous Epithelial Cells, UA None Seen /HPF      Hyaline Casts, UA None Seen /LPF      Methodology Manual Light Microscopy             Imaging:    CT Abdomen Pelvis Without Contrast    Result Date: 7/20/2024  CT ABDOMEN PELVIS WO CONTRAST Date of Exam: 7/20/2024 2:28 PM EDT Indication: Flank pain, kidney stone suspected abdominal pain flank pain. Comparison: 12/8/2014 Technique: Axial CT images were obtained of the abdomen and pelvis without the administration of contrast. Reconstructed coronal and sagittal images were also obtained. Automated exposure control and iterative construction methods were used. Findings: ABDOMEN: There is a calcified granuloma in the left lower lobe. There is a leadless cardiac pacemaker. The gallbladder is distended. There are layering tiny stones in the dependent gallbladder. Intrahepatic and extrahepatic biliary ductal dilatation appears to have increased slightly. There is a duodenal diverticulum. The unenhanced spleen, pancreas and adrenal glands are normal. Prior left nephrectomy. There is right hydronephrosis. 3.5 cm hypodense mass in the upper pole the right kidney is likely  a cyst. PELVIS: There is extensive urinary bladder distention measuring 19 cm in superior/inferior dimension. There is a large amount of stool in the rectum. Scoliosis and degenerative change are present in the lumbar spine. There are degenerative changes in the  hips. No evidence of bowel obstruction, perforation or abscess. Colonic diverticulosis is present. No CT evidence of  colitis or diverticulitis. There is a small amount of free fluid. There is mild ectasia of the abdominal aorta. Atherosclerotic disease is present.     Impression: 1. Extensive distention of the urinary bladder. Right hydronephrosis. 2. Large amount of stool in the rectum. 3. Slight interval increase in the chronic intrahepatic and extrahepatic bili ductal dilatation. 4. Distended gallbladder. Cholelithiasis. Electronically Signed: Yared Justin MD  7/20/2024 2:43 PM EDT  Workstation ID: [id number]       Differential Diagnosis and Discussion:    Abdominal Pain: Based on the patient's signs and symptoms, I considered abdominal aortic aneurysm, small bowel obstruction, pancreatitis, acute cholecystitis, acute appendecitis, peptic ulcer disease, gastritis, colitis, endocrine disorders, irritable bowel syndrome and other differential diagnosis an etiology of the patient's abdominal pain.    All labs were reviewed and interpreted by me.  CT scan radiology impression was interpreted by me.    MDM  Number of Diagnoses or Management Options  Acute urinary retention  Constipation, unspecified constipation type  Diagnosis management comments: In summary this is a 73-year-old male patient presents to the emergency department for evaluation of abdominal pain, urinary retention and constipation.  On bladder scan patient did have large volume urine, Prado catheter was anchored and patient had 1700 mL out.  CBC independently reviewed by me and shows no critical abnormalities.  CMP independently reviewed interpreted by me does reveal CKD.  Urinalysis negative for infection based on my independent review and interpretation.  CT scan abdomen pelvis reveals marked urinary distention.  Very strict return to ER and follow-up instructions have been provided to the patient.                   Patient Care Considerations:    CONSULT: I considered consulting urology, however no emergent indication, Prado catheter was placed with urine  output      Consultants/Shared Management Plan:    None    Social Determinants of Health:    Patient has presented with family members who are responsible, reliable and will ensure follow up care.      Disposition and Care Coordination:    Discharged: The patient is suitable and stable for discharge with no need for consideration of admission.    I have explained the patient´s condition, diagnoses and treatment plan based on the information available to me at this time. I have answered questions and addressed any concerns. The patient has a good  understanding of the patient´s diagnosis, condition, and treatment plan as can be expected at this point. The vital signs have been stable. The patient´s condition is stable and appropriate for discharge from the emergency department.      The patient will pursue further outpatient evaluation with the primary care physician or other designated or consulting physician as outlined in the discharge instructions. They are agreeable to this plan of care and follow-up instructions have been explained in detail. The patient has received these instructions in written format and has expressed an understanding of the discharge instructions. The patient is aware that any significant change in condition or worsening of symptoms should prompt an immediate return to this or the closest emergency department or call to 911.  I have explained discharge medications and the need for follow up with the patient/caretakers. This was also printed in the discharge instructions. Patient was discharged with the following medications and follow up:      Medication List      No changes were made to your prescriptions during this visit.      Elana Monterroso MD  [address] [place] [address] [phone]    In 1 week      Pedro Robles, APRN  [address]  [facility] [address]  [place] [address] [phone]    In 1 week         Final diagnoses:   Acute urinary retention    Constipation, unspecified constipation type        ED Disposition       ED Disposition   Discharge    Condition   Stable    Comment   --               This medical record created using voice recognition software.             Sharif Brito MD  07/20/24 6246

## 2024-07-23 ENCOUNTER — CLINICAL SUPPORT NO REQUIREMENTS (OUTPATIENT)
Age: 74
End: 2024-07-23
Payer: MEDICARE

## 2024-07-23 ENCOUNTER — OFFICE VISIT (OUTPATIENT)
Age: 74
End: 2024-07-23
Payer: MEDICARE

## 2024-07-23 ENCOUNTER — TELEPHONE (OUTPATIENT)
Dept: UROLOGY | Facility: CLINIC | Age: 74
End: 2024-07-23
Payer: MEDICARE

## 2024-07-23 VITALS
SYSTOLIC BLOOD PRESSURE: 144 MMHG | BODY MASS INDEX: 20.04 KG/M2 | HEART RATE: 65 BPM | WEIGHT: 140 LBS | HEIGHT: 70 IN | DIASTOLIC BLOOD PRESSURE: 78 MMHG

## 2024-07-23 DIAGNOSIS — F32.A DEPRESSION, UNSPECIFIED DEPRESSION TYPE: ICD-10-CM

## 2024-07-23 DIAGNOSIS — Z95.0 PRESENCE OF CARDIAC PACEMAKER: Primary | ICD-10-CM

## 2024-07-23 DIAGNOSIS — N18.4 KIDNEY DISEASE, CHRONIC, STAGE IV (GFR 15-29 ML/MIN): ICD-10-CM

## 2024-07-23 DIAGNOSIS — I45.2 BIFASCICULAR BLOCK: ICD-10-CM

## 2024-07-23 DIAGNOSIS — I10 ESSENTIAL HYPERTENSION: ICD-10-CM

## 2024-07-23 PROBLEM — N18.5 CKD (CHRONIC KIDNEY DISEASE) STAGE 5, GFR LESS THAN 15 ML/MIN: Status: ACTIVE | Noted: 2024-07-23

## 2024-07-23 PROCEDURE — 99214 OFFICE O/P EST MOD 30 MIN: CPT | Performed by: PHYSICIAN ASSISTANT

## 2024-07-23 PROCEDURE — 1159F MED LIST DOCD IN RCRD: CPT | Performed by: PHYSICIAN ASSISTANT

## 2024-07-23 PROCEDURE — 1160F RVW MEDS BY RX/DR IN RCRD: CPT | Performed by: PHYSICIAN ASSISTANT

## 2024-07-23 PROCEDURE — 3078F DIAST BP <80 MM HG: CPT | Performed by: PHYSICIAN ASSISTANT

## 2024-07-23 PROCEDURE — 3077F SYST BP >= 140 MM HG: CPT | Performed by: PHYSICIAN ASSISTANT

## 2024-07-23 PROCEDURE — 93000 ELECTROCARDIOGRAM COMPLETE: CPT | Performed by: PHYSICIAN ASSISTANT

## 2024-07-23 RX ORDER — DESVENLAFAXINE SUCCINATE 50 MG/1
50 TABLET, EXTENDED RELEASE ORAL DAILY
COMMUNITY

## 2024-07-23 NOTE — PROGRESS NOTES
ELECTROPHYSIOLOGY   Date of Office Visit: 2024  Patient Name: Victor M Grove  : 1950  Encounter Provider: Nomi Yost PA-C  Electrophysiologist: Dr. De La O  CHIEF COMPLAINT / REASON FOR OFFICE VISIT     sinus pause, Syncope, and Pacemaker Check      HISTORY OF PRESENT ILLNESS     This is a 73 y.o. year old male who presents to [facility] CARDIOLOGY for a for  follow up from their recent inpatient hospitalization.    Patient has a history of CKD, syncope, fascicular block, sinus pause and s/p pacemaker 2024.    The patient was hospitalized at [facility] in [place] in April following a syncopal event.  He was sent home with a monitor that showed sinus pauses, the longest being 12 seconds in duration.    Patient presented to the ED 2024 c/o dizziness and near syncope.  Patient received a Micra leadless pacemaker the following day.  Patient tolerated the procedure well and was d/c home.    Patient recently presented to the ED on 2024 c/o abdominal pain.  Patient reported constipation and urinary retention for 1 to 2 days.  Patient is stage IV CKD but is not on dialysis.  At this admission creatinine was 4.86.  CT showed acute urinary retention and constipation.  Bladder was drained with a wei catheter and the patient was discharged with the wei still in place.  Patient was instructed to follow up with urology.  Patient is established with a nephrologist.     Today the patient was accompanied by his wife for this visit.  Patient reported no issues following the Micra placement.  He denies dizziness and near syncope since being discharged.    Patient's wife provided some information regarding the patients overall mental health and stated that d/t his advanced kidney disease his lithium dosing had to be drastically decreased to a level that is not as effective at managing his bipolar and depression symptoms.    Patient had the wei catheter in place  "today that was placed during his ED visit on 7/20/2024.      Device interrogation today shows V pacing 1.1%. RV threshold today 1.25V @ 0.4 ms compared to his last visit which was 1.13V @ 0.4ms.  Home monitoring has been set up and the patient and his wife are aware of the schedule to send device reports.    PMHx:  Stage IV CKD, S/p micra  pacemaker 5/2024, bifascicular block, syncope    PHYSICAL EXAMINATION     Vital Signs:  /78   Pulse 65   Ht 177.8 cm (70\")   Wt 63.5 kg (140 lb)   BMI 20.09 kg/m²   Estimated body mass index is 20.09 kg/m² as calculated from the following:    Height as of this encounter: 177.8 cm (70\").    Weight as of this encounter: 63.5 kg (140 lb).       BMI is within normal parameters. No other follow-up for BMI required.      Physical Exam  Cardiovascular:      Rate and Rhythm: Normal rate and regular rhythm.      Pulses: Normal pulses.      Heart sounds: Normal heart sounds.   Pulmonary:      Effort: Pulmonary effort is normal.      Breath sounds: Normal breath sounds.   Musculoskeletal:      Right lower leg: No edema.      Left lower leg: No edema.   Neurological:      Mental Status: He is alert and oriented to person, place, and time.   Psychiatric:         Mood and Affect: Affect is flat.                Result Review :         Lab Results   Component Value Date     07/20/2024     05/31/2024    K 4.6 07/20/2024    K 4.0 05/31/2024     07/20/2024     (H) 05/31/2024    CO2 19.2 (L) 07/20/2024    CO2 21.0 (L) 05/31/2024    BUN 62 (H) 07/20/2024    BUN 57 (H) 05/31/2024    CREATININE 4.86 (H) 07/20/2024    CREATININE 4.09 (H) 05/31/2024    EGFRIFNONA 25 (L) 03/22/2022    EGFRIFNONA 25 (L) 03/19/2022    EGFRIFAFRI 31 (L) 03/22/2022    EGFRIFAFRI 30 (L) 03/19/2022    GLUCOSE 153 (H) 07/20/2024    GLUCOSE 160 (H) 05/31/2024    CALCIUM 10.3 07/20/2024    CALCIUM 9.1 05/31/2024    ALBUMIN 4.5 07/20/2024    ALBUMIN 3.3 (L) 03/03/2022    AST 13 07/20/2024    AST 39 " "03/02/2022    ALT 7 07/20/2024    ALT 74 (H) 03/02/2022     Lab Results   Component Value Date    WBC 12.53 (H) 07/20/2024    WBC 8.86 05/31/2024    HGB 10.7 (L) 07/20/2024    HGB 11.5 (L) 05/31/2024    HCT 33.8 (L) 07/20/2024    HCT 35.3 (L) 05/31/2024    MCV 89.4 07/20/2024    MCV 89.1 05/31/2024     07/20/2024     05/31/2024     No results found for: \"PROBNP\", \"BNP\"  Lab Results   Component Value Date    TROPONINI 0.01 01/27/2022    TROPONINT 31 (H) 03/04/2022    TROPONINT -2 03/04/2022     Lab Results   Component Value Date    TSH 1.830 04/24/2024                 ECG 12 Lead    Date/Time: 7/23/2024 2:58 PM  Performed by: Nomi Hunter PA-C    Authorized by: Nomi Hunter PA-C  Comparison: compared with previous ECG   Rhythm: sinus rhythm  BPM: 65  Conduction: right bundle branch block and left anterior fascicular block  Comments: LAD, Qtc 456                ASSESSMENT & PLAN       Diagnoses and all orders for this visit:    1. Presence of cardiac pacemaker (Primary)  Micra ppm- Normal testing and function  V pacing 1.1%  RV threshold today 1.25V @ 0.4 ms compared to his last visit which was 1.13V @ 0.4ms  Home monitoring has been set up with device reporting schedule    2. CKD (chronic kidney disease) stage 4, GFR 15-29 ml/min  Patient currently established with a nephrologist  Not currently on dialysis  Lithium dosing recently decreased d/t kidney toxicity  Not currently on an ACEi/ARB    3. Depression, unspecified depression type  Currently on lithium 300 mg, recently decreased from 900 mg and not as effective at symptom management  Patient is established with psych    4. Bifascicular block  S/p pacemaker 5/2024, normal testing and function  HR 65    5. Hypertension  BP controlled, 140s/70s  Nifedipine and metoprolol to manage BP        Follow Up:  Return in 6 months (on 1/23/2025) for Dr. De La O- Routine.  Patient was given instructions and counseling regarding his condition " or for health maintenance advice. Please contact office if worsening symptoms or proceed to ER when appropriate.      Nomi Yost PA-C  07/23/24  14:58 EDT    MEDICATIONS         Discharge Medications            Accurate as of July 23, 2024  2:58 PM. If you have any questions, ask your nurse or doctor.                Continue These Medications        Instructions Start Date   Cariprazine HCl 1.5 MG capsule capsule  Commonly known as: VRAYLAR   1.5 mg, Oral, Daily      Cholecalciferol 50 MCG (2000 UT) tablet   4,000 Units, Oral, Daily      cyanocobalamin 1000 MCG/ML injection   1,000 mcg, Intramuscular, Every 30 Days      desvenlafaxine 50 MG 24 hr tablet  Commonly known as: PRISTIQ   50 mg, Oral, Daily      ferrous sulfate 325 (65 FE) MG tablet   1 tablet, Oral, Daily With Breakfast      IRON PO   Oral      lithium 300 MG CR tablet  Commonly known as: LITHOBID   300 mg, Oral, Daily      Melatonin 10 MG tablet   1 tablet, Oral, Daily      metoprolol succinate XL 25 MG 24 hr tablet  Commonly known as: TOPROL-XL   25 mg, Oral, Daily      MIRALAX PO   Oral      NIFEdipine XL 60 MG 24 hr tablet  Commonly known as: PROCARDIA XL   60 mg, Oral, Daily      tamsulosin 0.4 MG capsule 24 hr capsule  Commonly known as: FLOMAX   0.4 mg, Oral, Daily                   **[place] Disclaimer: This note was dictated using an electronic transcription. The electronic translation of spoken language may permit erroneous, or at times, nonsensical words or phrases to be inadvertently transcribed. Although I have reviewed the note for such errors, some may still exist.

## 2024-07-23 NOTE — TELEPHONE ENCOUNTER
PATIENT'S WIFE RETURNED CALL TO THE [facility] TO SCHEDULE THE REFERRAL APPOINTMENT.  [facility] TRIED TO SCHEDULE, PER GÓMEZ, IN 4 - 6 WEEKS WITH SOPHIE.  WIFE TOLD THEM THAT IT WAS NOT SOON ENOUGH, BECAUSE HE HAS A CATHETER.  TRANSFERRED WIFE TO ME.    I SPOKE TO THE PATIENT'S WIFE.  SHE SAID THE [facility] TRIED TO SCHEDULE THE PATIENT ON 09/03/24.  SHE SAID THIS IS NOT SOON ENOUGH, BECAUSE HE HAS A CATHETER.  HIS PAPERWORK INSTRUCTIONS SAY HE IS TO F/U WITH DR. TRAMMELL IN ONE WEEK.  HE WAS NOT TOLD TO F/U WITH NP.

## 2024-07-24 ENCOUNTER — TELEPHONE (OUTPATIENT)
Dept: UROLOGY | Facility: CLINIC | Age: 74
End: 2024-07-24
Payer: MEDICARE

## 2024-07-24 DIAGNOSIS — R33.9 URINARY RETENTION: Primary | ICD-10-CM

## 2024-07-24 NOTE — TELEPHONE ENCOUNTER
Caller: ANUEL GO    Relationship: SELF    Best call back number: [phone]     What is the best time to reach you:     Who are you requesting to speak with (clinical staff, provider,  specific staff member): N/A    Do you know the name of the person who called: N/A    What was the call regarding: INCOMING CALL FROM PT WIFE, SAYS SHE MISSED A CALL, NO TELEPHONE ENCOUNTERS IN CHART.     Is it okay if the provider responds through MyChart: N/A

## 2024-07-25 NOTE — TELEPHONE ENCOUNTER
Patient needs to have bmp and cali done.  Was letting patient be aware of this for his up coming apt in August.

## 2024-07-29 NOTE — TELEPHONE ENCOUNTER
PATIENT'S WIFE CALLED.  I TOLD HER, PER GÓMEZ:    Patient needs to have bmp and cali done.  Was letting patient be aware of this for his up coming apt in August.        I GAVE HER THE PHONE NUMBER TO SCHEDULING AND TRANSFERRED HER THERE TO SCHEDULE THE US.  SHE IS AWARE PATIENT NEEDS THAT AND THE BMP PRIOR TO APPOINTMENT IN AUGUST.

## 2024-07-30 ENCOUNTER — HOSPITAL ENCOUNTER (OUTPATIENT)
Dept: ULTRASOUND IMAGING | Facility: HOSPITAL | Age: 74
Discharge: HOME | End: 2024-07-30
Payer: MEDICARE

## 2024-07-30 ENCOUNTER — LAB (OUTPATIENT)
Dept: LAB | Facility: HOSPITAL | Age: 74
End: 2024-07-30
Payer: MEDICARE

## 2024-07-30 DIAGNOSIS — R33.9 URINARY RETENTION: ICD-10-CM

## 2024-07-30 LAB
ANION GAP SERPL CALCULATED.3IONS-SCNC: 7 MMOL/L (ref 5–15)
BUN SERPL-MCNC: 54 MG/DL (ref 8–23)
BUN/CREAT SERPL: 12.1 (ref 7–25)
CALCIUM SPEC-SCNC: 9.3 MG/DL (ref 8.6–10.5)
CHLORIDE SERPL-SCNC: 111 MMOL/L (ref 98–107)
CO2 SERPL-SCNC: 19 MMOL/L (ref 22–29)
CREAT SERPL-MCNC: 4.46 MG/DL (ref 0.76–1.27)
EGFRCR SERPLBLD CKD-EPI 2021: 13.3 ML/MIN/1.73
GLUCOSE SERPL-MCNC: 106 MG/DL (ref 65–99)
POTASSIUM SERPL-SCNC: 5.2 MMOL/L (ref 3.5–5.2)
SODIUM SERPL-SCNC: 137 MMOL/L (ref 136–145)

## 2024-07-30 PROCEDURE — 76775 US EXAM ABDO BACK WALL LIM: CPT

## 2024-07-30 PROCEDURE — 80048 BASIC METABOLIC PNL TOTAL CA: CPT

## 2024-07-30 PROCEDURE — 36415 COLL VENOUS BLD VENIPUNCTURE: CPT

## 2024-07-31 ENCOUNTER — TELEPHONE (OUTPATIENT)
Dept: UROLOGY | Facility: CLINIC | Age: 74
End: 2024-07-31
Payer: MEDICARE

## 2024-08-07 ENCOUNTER — OFFICE VISIT (OUTPATIENT)
Dept: UROLOGY | Facility: CLINIC | Age: 74
End: 2024-08-07
Payer: MEDICARE

## 2024-08-07 VITALS
DIASTOLIC BLOOD PRESSURE: 75 MMHG | HEIGHT: 70 IN | SYSTOLIC BLOOD PRESSURE: 152 MMHG | BODY MASS INDEX: 19.61 KG/M2 | WEIGHT: 137 LBS

## 2024-08-07 DIAGNOSIS — Z90.5 SOLITARY KIDNEY, ACQUIRED: ICD-10-CM

## 2024-08-07 DIAGNOSIS — N18.4 STAGE 4 CHRONIC KIDNEY DISEASE: ICD-10-CM

## 2024-08-07 DIAGNOSIS — R33.9 URINARY RETENTION: Primary | ICD-10-CM

## 2024-08-07 NOTE — PROGRESS NOTES
UROLOGY OFFICE H&P NOTE    Subjective   HPI  Victor M Grove is a 73 y.o. male.  History of CKD, solitary kidney, history of left RCC status post nephrectomy who presents for evaluation of urinary retention.  Accompanied by his wife.  History of Present Illness  Patient presented to ER with inability to urinate, 7/20/2024.  CT scan revealed solitary kidney with right hydroureteronephrosis and hyper distended bladder.  Patient has had a catheter since.    His medical history includes kidney cancer, which was surgically removed on 12/04/2014 by Dr. Rolan Tripp in [place].     For couple years, he has been experiencing difficulty urinating, urinating every 20 to 30 minutes, which has progressively worsened.   Prior to ER presentation, he experienced pain radiating from his front to his back during his ER visit.     Wife and him do not believe he would be compliant with intermittent self cath.      He is scheduled to see his nephrologist, Dr. Whalen in a week for a repeat blood test.       _________  Renal ultrasound 7/30/2024: No right hydronephrosis, right renal cyst; changes from left nephrectomy    CT abdomen pelvis without contrast 7/20/2024:  Extensive distention of the urinary bladder. Right hydronephrosis.   2. Large amount of stool in the rectum.   3. Slight interval increase in the chronic intrahepatic and extrahepatic bili ductal dilatation.   4. Distended gallbladder. Cholelithiasis.    Creatinine  7/30/2024: 4.46  7/20/2024: 4.86     UA 7/20/2024: Negative for hematuria or suspicion of infection; > 300 mg/dL protein    Medical History:  Past Medical History:   Diagnosis Date    Cancer     Squamous cell skin    Cancer of kidney     Hypertension     Kidney failure         Social History:  Social History     Socioeconomic History    Marital status:    Tobacco Use    Smoking status: Former     Current packs/day: 0.00     Average packs/day: 1 pack/day for 43.0 years (43.0 ttl pk-yrs)      Types: Cigarettes     Start date: 1971     Quit date: 2014     Years since quitting: 10.6     Passive exposure: Never    Smokeless tobacco: Never   Vaping Use    Vaping status: Never Used   Substance and Sexual Activity    Alcohol use: Not Currently    Drug use: Never        Family History:  Family History   Problem Relation Age of Onset    Hypertension Mother     Hypertension Father     Heart attack Father     Heart attack Brother     Coronary artery disease Brother     Cancer Brother     Diabetes Brother     Aneurysm Paternal Uncle     Cancer Maternal Grandmother         Surgical History:  Past Surgical History:   Procedure Laterality Date    APPENDECTOMY      HERNIA REPAIR      NEPHRECTOMY Left 2014    PACEMAKER IMPLANTATION N/A 5/30/2024    Procedure: Implant or Replacement of Single Chamber Leadless Pacemaker, RV Only;  Surgeon: Ramirez De La O MD;  [facility];  Service: Cardiovascular;  Laterality: N/A;    SMALL BOWEL EMBOLIZATION      TURP / TRANSURETHRAL INCISION / DRAINAGE PROSTATE          Allergies:  Allergies   Allergen Reactions    Nitroglycerin Other (See Comments)     Other reaction(s): Loss of Consciousness    Other reaction(s): Loss of Consciousness   Other reaction(s): Loss of Consciousness   Other reaction(s): Loss of Consciousness        Current Medications:  Current Outpatient Medications   Medication Sig Dispense Refill    Cariprazine HCl (VRAYLAR) 1.5 MG capsule capsule Take 1 capsule by mouth Daily.      Cholecalciferol 50 MCG (2000 UT) tablet Take 2 tablets by mouth Daily.      cyanocobalamin 1000 MCG/ML injection Inject 1 mL into the appropriate muscle as directed by prescriber Every 30 (Thirty) Days.      desvenlafaxine (PRISTIQ) 50 MG 24 hr tablet Take 1 tablet by mouth Daily.      Ferrous Sulfate (IRON PO) Take  by mouth.      ferrous sulfate 325 (65 FE) MG tablet Take 1 tablet by mouth Daily With Breakfast.      lithium (LITHOBID) 300 MG CR tablet Take 1  "tablet by mouth Daily.      Melatonin 10 MG tablet Take 1 tablet by mouth Daily.      metoprolol succinate XL (TOPROL-XL) 25 MG 24 hr tablet Take 1 tablet by mouth Daily.      NIFEdipine XL (PROCARDIA XL) 60 MG 24 hr tablet Take 1 tablet by mouth Daily.      Polyethylene Glycol 3350 (MIRALAX PO) Take  by mouth.      tamsulosin (FLOMAX) 0.4 MG capsule 24 hr capsule Take 1 capsule by mouth Daily.       No current facility-administered medications for this visit.       Review of systems  A review of systems was performed, and positive findings are noted in the HPI.    Objective     Vital Signs:   /75   Ht 177.8 cm (70\")   Wt 62.1 kg (137 lb)   BMI 19.66 kg/m²       Physical exam  No acute distress, thin    Awake alert and oriented  Mood normal; affect normal  Physical Exam      Catheter exchange  Pre-Operative Diagnosis:  Urinary retention     Post-Operative Diagnosis:  Same     Performed by Pallavi Tyler RN    Procedure Performed/Technique  Exchange of a Prado catheter     Specimen/Tissue Removed:  None     Findings:  Previously placed Prado catheter was removed without issue.  Patient was prepped in a clean fashion and remained supine.  Lubricating jelly was inserted into the urethra and allowed to dwell.  18 Turkmen coudé catheter was then placed without difficulty into the bladder.  Return of urine.  Balloon inflated and catheter secured.  Tolerated well.    Results  Results  Imaging  CT scan shows bladder distention up to ribs. Ultrasound showed resolution of kidney pressure (hydronephrosis) with the catheter.     Problem List:  Patient Active Problem List   Diagnosis    Syncope and collapse    Sinus pause    Bifascicular block    Syncope    Presence of cardiac pacemaker    Kidney disease, chronic, stage IV (GFR 15-29 ml/min)    Depression    Essential hypertension       Assessment & Plan   Diagnoses and all orders for this visit:    1. Urinary retention (Primary)    2. Solitary kidney, acquired    3. Stage " 4 chronic kidney disease      CT scan imaging, labs, and chart, personally reviewed by me.  Very distended bladder; unknown if functioning.  Reports long history of severe urinary frequency with voiding small amounts, suspect chronic incomplete emptying of bladder which may have exacerbated his CKD.    Fortunately, resolution of hydroureteronephrosis after decompression with catheter on ultrasound    Unknown how much renal function will improve with decompression with catheter.  Sees nephrology in about a week.    Continue to monitor for creatinine destinee, optimal recovery of kidney function; this will greatly determine urologic options of management, need for further workup and consideration of procedural management.    Catheter exchanged today  Follow-up 1 month, RN visit catheter exchange    Follow-up 2 months with me, possible void trial, BMP prior  All questions addressed      MDM moderate: Undiagnosed new problem and some prognosis; independent interpretation of test performed by other professional; i.e. actual CT imaging from 7/20/2024 performed at [facility] reviewed with patient  provided independent interpretation and management recommendation    Patient or patient representative verbalized consent for the use of Ambient Listening during the visit with  Elana Monterroso MD for chart documentation. 8/7/2024  14:42 EDT

## 2024-09-04 ENCOUNTER — CLINICAL SUPPORT (OUTPATIENT)
Dept: UROLOGY | Facility: CLINIC | Age: 74
End: 2024-09-04
Payer: MEDICARE

## 2024-09-04 VITALS — WEIGHT: 137 LBS | RESPIRATION RATE: 16 BRPM | BODY MASS INDEX: 19.61 KG/M2 | HEIGHT: 70 IN

## 2024-09-04 DIAGNOSIS — R33.9 URINARY RETENTION: Primary | ICD-10-CM

## 2024-09-04 NOTE — PROGRESS NOTES
"Chief Complaint  monthly catheter change    Subjective        Victor M Grove presents to [facility] UROLOGY  History of Present Illness    Objective   Vital Signs:  Resp 16   Ht 177.8 cm (70\")   Wt 62.1 kg (137 lb)   BMI 19.66 kg/m²   Estimated body mass index is 19.66 kg/m² as calculated from the following:    Height as of this encounter: 177.8 cm (70\").    Weight as of this encounter: 62.1 kg (137 lb).    BMI is within normal parameters. No other follow-up for BMI required.      Physical Exam   Result Review :         Cath Change, Simple    Date/Time: 9/4/2024 1:35 PM    Performed by: Pallavi Tyler RN  Authorized by: Elana Monterroso MD  Preparation: Patient was prepped and draped in the usual sterile fashion.  Local anesthesia used: no    Anesthesia:  Local anesthesia used: no    Sedation:  Patient sedated: no    Patient tolerance: patient tolerated the procedure well with no immediate complications  Comments: Patient presented to office for a catheter change.  Patient in supine position.  Deflated balloon on existing catheter and removed without concerns.  Using sterile technique cleansed genitalia, inserted a 18 coude Israeli, instilled 5-10 cc of sterile water into balloon, attached catheter bag with urine return.  Patient tolerated well.          Assessment and Plan   There are no diagnoses linked to this encounter.         Follow Up   No follow-ups on file.  Patient was given instructions and counseling regarding his condition or for health maintenance advice. Please see specific information pulled into the AVS if appropriate.             "

## 2024-10-01 ENCOUNTER — OFFICE VISIT (OUTPATIENT)
Dept: UROLOGY | Facility: CLINIC | Age: 74
End: 2024-10-01
Payer: MEDICARE

## 2024-10-01 VITALS
HEART RATE: 63 BPM | HEIGHT: 70 IN | SYSTOLIC BLOOD PRESSURE: 148 MMHG | DIASTOLIC BLOOD PRESSURE: 74 MMHG | WEIGHT: 136 LBS | BODY MASS INDEX: 19.47 KG/M2

## 2024-10-01 DIAGNOSIS — R33.9 URINARY RETENTION: Primary | ICD-10-CM

## 2024-10-01 DIAGNOSIS — N18.4 STAGE 4 CHRONIC KIDNEY DISEASE: ICD-10-CM

## 2024-10-01 DIAGNOSIS — Z90.5 SOLITARY KIDNEY, ACQUIRED: ICD-10-CM

## 2024-10-01 NOTE — PROGRESS NOTES
Patient presented to office for a catheter change.  Patient in supine position.  Deflated balloon on existing catheter and removed without concerns.  Using sterile technique cleansed genitalia, inserted a 18 Ethiopian coude, instilled 5-10 cc of sterile water into balloon, attached catheter bag with urine return.  Patient tolerated well.

## 2024-10-01 NOTE — PROGRESS NOTES
UROLOGY OFFICE FOLLOW UP NOTE    Subjective   HPI  Victor M Grove is a 74 y.o. male.   History of CKD, solitary kidney, history of left RCC status post nephrectomy who presents for evaluation of urinary retention.  Accompanied by his wife.  History of Present Illness  Patient presented to ER with inability to urinate, 7/20/2024.  CT scan revealed solitary kidney with right hydroureteronephrosis and hyper distended bladder.  Patient has had a catheter since.     His medical history includes kidney cancer, which was surgically removed on 12/04/2014 by Dr. Rolan Tripp in [place].      For couple years, he has been experiencing difficulty urinating, urinating every 20 to 30 minutes, which has progressively worsened.   Prior to ER presentation, he experienced pain radiating from his front to his back during his ER visit.      Wife and him do not believe he would be compliant with intermittent self cath.       He is scheduled to see his nephrologist, Dr. Whalen in a week for a repeat blood test.        Update 10/2/2024: Presents for follow-up urinary retention managed with Prado catheter, solitary kidney with CKD, wife reports at today's visit stage V.  Due for catheter exchange.  Intermittent self cath was readdressed at today's visit; they do not believe he would be able to reliably perform.  Also notes that he has tremor so they are unsure if he would physically be able to self cath.  History of Present Illness  He reports no issues with his current catheter and expresses a preference for regular catheter changes.   At time of acute urinary retention, was in severe pain.  CKD managed by nephrology, Dr. Whalen, determining whether he needs to go on dialysis.  Sees him later this month.      _________  Renal ultrasound 7/30/2024: No right hydronephrosis, right renal cyst; changes from left nephrectomy     CT abdomen pelvis without contrast 7/20/2024:  Extensive distention of the urinary bladder. Right  "hydronephrosis.   2. Large amount of stool in the rectum.   3. Slight interval increase in the chronic intrahepatic and extrahepatic bili ductal dilatation.   4. Distended gallbladder. Cholelithiasis.     Creatinine  8/16/2024: 4.68  7/30/2024: 4.46  7/20/2024: 4.86     UA 7/20/2024: Negative for hematuria or suspicion of infection; > 300 mg/dL protein          Review of systems  A review of systems was performed, and positive findings are noted in the HPI.    Objective     Vital Signs:   /74   Pulse 63   Ht 177.8 cm (70\")   Wt 61.7 kg (136 lb)   BMI 19.51 kg/m²       Physical exam  No acute distress, well-nourished  Awake alert and oriented  Mood normal; affect normal  Physical Exam          Catheter exchange  Pre-Operative Diagnosis:  Urinary retention     Post-Operative Diagnosis:  Same     Performed by MEHNAZ Cooper     Procedure Performed/Technique  Exchange of a Prado catheter     Specimen/Tissue Removed:  None     Findings:  Previously placed Prado catheter was removed without issue.  Patient was prepped in a clean fashion and remained supine.  Lubricating jelly was inserted into the urethra and allowed to dwell.  18 Indian coudé catheter was then placed without difficulty into the bladder.  Return of urine.  Balloon inflated and catheter secured.  Tolerated well.    Problem List:  Patient Active Problem List   Diagnosis    Syncope and collapse    Sinus pause    Bifascicular block    Syncope    Presence of cardiac pacemaker    Kidney disease, chronic, stage IV (GFR 15-29 ml/min)    Depression    Essential hypertension       Assessment & Plan   Diagnoses and all orders for this visit:    1. Urinary retention (Primary)  -     Prado Catheter Insertion / Removal / Change    2. Solitary kidney, acquired    3. Stage 4 chronic kidney disease      Catheter exchange at today's visit  Renal function stable from previous with catheter  Per wife, considering dialysis, has appoint with nephrology later this " month; unknown if dialysis would significantly decrease his urine output and eliminate need for indwelling Prado catheter.    Aware that he may require indefinite indwelling Prado catheters options of management are very limited.  Unknown if he has procedural options to assist with emptying.    Could consider workup (cystoscopy and UDS) for consideration of procedural management; does not wish to proceed with workup at this time.  Assessment & Plan  Monthly nurse visits will be scheduled for catheter replacement   Routine annual visit with me, or earlier as needed       All questions addressed      Patient or patient representative verbalized consent for the use of Ambient Listening during the visit with  Elana Monterroso MD for chart documentation. 10/2/2024  05:12 EDT

## 2024-10-30 ENCOUNTER — TRANSCRIBE ORDERS (OUTPATIENT)
Dept: VASCULAR SURGERY | Facility: HOSPITAL | Age: 74
End: 2024-10-30
Payer: MEDICARE

## 2024-10-30 DIAGNOSIS — N18.5 CHRONIC KIDNEY DISEASE, STAGE V: Primary | ICD-10-CM

## 2024-11-04 ENCOUNTER — CLINICAL SUPPORT (OUTPATIENT)
Dept: UROLOGY | Age: 74
End: 2024-11-04
Payer: MEDICARE

## 2024-11-04 VITALS — BODY MASS INDEX: 19.47 KG/M2 | WEIGHT: 136 LBS | HEIGHT: 70 IN

## 2024-11-04 DIAGNOSIS — R33.9 URINARY RETENTION: Primary | ICD-10-CM

## 2024-11-04 PROCEDURE — 51702 INSERT TEMP BLADDER CATH: CPT | Performed by: UROLOGY

## 2024-11-04 RX ORDER — LITHIUM CARBONATE 150 MG/1
150 CAPSULE ORAL DAILY
COMMUNITY
Start: 2024-10-15

## 2024-11-04 NOTE — PROGRESS NOTES
Procedure   Cath Change, Simple    Date/Time: 11/4/2024 11:55 AM    Performed by: Claudia Lackey  Authorized by: Elana Monterroso MD  Preparation: Patient was prepped and draped in the usual sterile fashion.  Local anesthesia used: no    Anesthesia:  Local anesthesia used: no    Sedation:  Patient sedated: no    Patient tolerance: patient tolerated the procedure well with no immediate complications  Comments: Patient presented to office for a catheter change.  Patient in supine position.  Deflated balloon on existing catheter and removed without concerns.  Using sterile technique cleansed genitalia, inserted a 18 Swedish coude, instilled 5-10 cc of sterile water into balloon, attached catheter bag with urine return.  Patient tolerated well.

## 2024-11-18 ENCOUNTER — TRANSCRIBE ORDERS (OUTPATIENT)
Dept: ADMINISTRATIVE | Facility: HOSPITAL | Age: 74
End: 2024-11-18
Payer: MEDICARE

## 2024-11-18 DIAGNOSIS — N18.6 ESRD (END STAGE RENAL DISEASE): Primary | ICD-10-CM

## 2024-11-18 DIAGNOSIS — Z01.818 PRE-OP TESTING: ICD-10-CM

## 2024-11-20 ENCOUNTER — LAB (OUTPATIENT)
Facility: HOSPITAL | Age: 74
End: 2024-11-20
Payer: MEDICARE

## 2024-11-20 DIAGNOSIS — Z01.818 PRE-OP TESTING: ICD-10-CM

## 2024-11-20 DIAGNOSIS — N18.6 ESRD (END STAGE RENAL DISEASE): ICD-10-CM

## 2024-11-20 LAB
ABO GROUP BLD: NORMAL
ALBUMIN SERPL-MCNC: 4.1 G/DL (ref 3.5–5.2)
ALBUMIN/GLOB SERPL: 1.5 G/DL
ALP SERPL-CCNC: 54 U/L (ref 39–117)
ALT SERPL W P-5'-P-CCNC: 8 U/L (ref 1–41)
ANION GAP SERPL CALCULATED.3IONS-SCNC: 11 MMOL/L (ref 5–15)
APTT PPP: 34.2 SECONDS (ref 24.2–34.2)
AST SERPL-CCNC: 6 U/L (ref 1–40)
BILIRUB SERPL-MCNC: 0.2 MG/DL (ref 0–1.2)
BUN SERPL-MCNC: 53 MG/DL (ref 8–23)
BUN/CREAT SERPL: 10.9 (ref 7–25)
CALCIUM SPEC-SCNC: 9.6 MG/DL (ref 8.6–10.5)
CHLORIDE SERPL-SCNC: 106 MMOL/L (ref 98–107)
CO2 SERPL-SCNC: 21 MMOL/L (ref 22–29)
CREAT SERPL-MCNC: 4.86 MG/DL (ref 0.76–1.27)
EGFRCR SERPLBLD CKD-EPI 2021: 11.9 ML/MIN/1.73
GLOBULIN UR ELPH-MCNC: 2.7 GM/DL
GLUCOSE SERPL-MCNC: 84 MG/DL (ref 65–99)
INR PPP: 1.03 (ref 0.86–1.15)
POTASSIUM SERPL-SCNC: 4.4 MMOL/L (ref 3.5–5.2)
PROT SERPL-MCNC: 6.8 G/DL (ref 6–8.5)
PROTHROMBIN TIME: 13.7 SECONDS (ref 11.8–14.9)
RH BLD: POSITIVE
SODIUM SERPL-SCNC: 138 MMOL/L (ref 136–145)

## 2024-11-20 PROCEDURE — 86901 BLOOD TYPING SEROLOGIC RH(D): CPT

## 2024-11-20 PROCEDURE — 86900 BLOOD TYPING SEROLOGIC ABO: CPT

## 2024-11-20 PROCEDURE — 36415 COLL VENOUS BLD VENIPUNCTURE: CPT

## 2024-11-20 PROCEDURE — 85610 PROTHROMBIN TIME: CPT

## 2024-11-20 PROCEDURE — 85730 THROMBOPLASTIN TIME PARTIAL: CPT

## 2024-11-20 PROCEDURE — 80053 COMPREHEN METABOLIC PANEL: CPT

## 2024-12-02 ENCOUNTER — CLINICAL SUPPORT (OUTPATIENT)
Dept: UROLOGY | Age: 74
End: 2024-12-02
Payer: MEDICARE

## 2024-12-02 VITALS — RESPIRATION RATE: 16 BRPM | HEIGHT: 70 IN | BODY MASS INDEX: 19.47 KG/M2 | WEIGHT: 136 LBS

## 2024-12-02 DIAGNOSIS — R33.9 URINARY RETENTION: Primary | ICD-10-CM

## 2024-12-02 PROCEDURE — 51702 INSERT TEMP BLADDER CATH: CPT | Performed by: UROLOGY

## 2024-12-02 RX ORDER — POLYETHYLENE GLYCOL 3350 17 G/17G
17 POWDER, FOR SOLUTION ORAL DAILY
COMMUNITY

## 2024-12-02 RX ORDER — HYDROCODONE BITARTRATE AND ACETAMINOPHEN 5; 325 MG/1; MG/1
TABLET ORAL
COMMUNITY
Start: 2024-11-25

## 2024-12-02 RX ORDER — PHENOL 1.4 %
10 AEROSOL, SPRAY (ML) MUCOUS MEMBRANE DAILY
COMMUNITY

## 2024-12-02 NOTE — PROGRESS NOTES
Procedure   Cath Change, Simple    Date/Time: 12/2/2024 12:00 PM    Performed by: Irma Mcclelland RN  Authorized by: Elana oMnterroso MD  Preparation: Patient was prepped and draped in the usual sterile fashion.  Local anesthesia used: no    Anesthesia:  Local anesthesia used: no    Sedation:  Patient sedated: no    Patient tolerance: patient tolerated the procedure well with no immediate complications  Comments: Patient presented to office for a catheter change.  Patient in supine position.  Deflated balloon on existing catheter and removed without concerns.  Using sterile technique cleansed genitalia, inserted a 18 Macanese coude silicone, instilled 5-10 cc of sterile water into balloon, attached catheter bag with urine return.  Patient tolerated well.        None

## 2025-01-02 ENCOUNTER — TELEPHONE (OUTPATIENT)
Dept: UROLOGY | Age: 75
End: 2025-01-02
Payer: MEDICARE

## 2025-01-02 ENCOUNTER — CLINICAL SUPPORT (OUTPATIENT)
Dept: UROLOGY | Age: 75
End: 2025-01-02
Payer: MEDICARE

## 2025-01-02 VITALS — HEIGHT: 70 IN | WEIGHT: 136 LBS | BODY MASS INDEX: 19.47 KG/M2 | RESPIRATION RATE: 16 BRPM

## 2025-01-02 DIAGNOSIS — R33.9 URINARY RETENTION: Primary | ICD-10-CM

## 2025-01-02 NOTE — PROGRESS NOTES
Procedure   Cath Change, Simple    Date/Time: 1/2/2025 12:00 PM    Performed by: Irma Mcclelland RN  Authorized by: Elana Monterroso MD  Preparation: Patient was prepped and draped in the usual sterile fashion.  Local anesthesia used: no    Anesthesia:  Local anesthesia used: no    Sedation:  Patient sedated: no    Patient tolerance: patient tolerated the procedure well with no immediate complications  Comments: Patient presented to office for a catheter change.  Patient in supine position.  Deflated balloon on existing catheter and removed without concerns.  Using sterile technique cleansed genitalia, inserted a 18 Filipino silicone coude catheter, instilled 5-10 cc of sterile water into balloon, attached catheter bag with urine return.  Patient tolerated well.

## 2025-01-02 NOTE — TELEPHONE ENCOUNTER
Pt Wife called in to r/s 1/6 cath change to today d/t implement weather; pt sched for 2 pm but might come sooner after infusion at [facility]; no further questions.

## 2025-01-17 ENCOUNTER — HOSPITAL ENCOUNTER (EMERGENCY)
Facility: HOSPITAL | Age: 75
Discharge: HOME | End: 2025-01-17
Attending: EMERGENCY MEDICINE
Payer: MEDICARE

## 2025-01-17 VITALS
SYSTOLIC BLOOD PRESSURE: 144 MMHG | OXYGEN SATURATION: 96 % | TEMPERATURE: 98.5 F | BODY MASS INDEX: 19.57 KG/M2 | RESPIRATION RATE: 13 BRPM | HEART RATE: 76 BPM | HEIGHT: 70 IN | WEIGHT: 136.69 LBS | DIASTOLIC BLOOD PRESSURE: 66 MMHG

## 2025-01-17 DIAGNOSIS — Z48.89 ENCOUNTER FOR POST SURGICAL WOUND CHECK: Primary | ICD-10-CM

## 2025-01-17 LAB
ALBUMIN SERPL-MCNC: 3.7 G/DL (ref 3.5–5.2)
ALBUMIN/GLOB SERPL: 1.4 G/DL
ALP SERPL-CCNC: 43 U/L (ref 39–117)
ALT SERPL W P-5'-P-CCNC: <5 U/L (ref 1–41)
ANION GAP SERPL CALCULATED.3IONS-SCNC: 11 MMOL/L (ref 5–15)
AST SERPL-CCNC: 8 U/L (ref 1–40)
BASOPHILS # BLD AUTO: 0.01 10*3/MM3 (ref 0–0.2)
BASOPHILS NFR BLD AUTO: 0.1 % (ref 0–1.5)
BILIRUB SERPL-MCNC: 0.2 MG/DL (ref 0–1.2)
BUN SERPL-MCNC: 66 MG/DL (ref 8–23)
BUN/CREAT SERPL: 13.5 (ref 7–25)
CALCIUM SPEC-SCNC: 9.2 MG/DL (ref 8.6–10.5)
CHLORIDE SERPL-SCNC: 108 MMOL/L (ref 98–107)
CO2 SERPL-SCNC: 20 MMOL/L (ref 22–29)
CREAT SERPL-MCNC: 4.89 MG/DL (ref 0.76–1.27)
DEPRECATED RDW RBC AUTO: 49.5 FL (ref 37–54)
EGFRCR SERPLBLD CKD-EPI 2021: 11.8 ML/MIN/1.73
EOSINOPHIL # BLD AUTO: 0.13 10*3/MM3 (ref 0–0.4)
EOSINOPHIL NFR BLD AUTO: 1.5 % (ref 0.3–6.2)
ERYTHROCYTE [DISTWIDTH] IN BLOOD BY AUTOMATED COUNT: 14.8 % (ref 12.3–15.4)
GLOBULIN UR ELPH-MCNC: 2.6 GM/DL
GLUCOSE SERPL-MCNC: 98 MG/DL (ref 65–99)
HCT VFR BLD AUTO: 28.4 % (ref 37.5–51)
HGB BLD-MCNC: 8.6 G/DL (ref 13–17.7)
HOLD SPECIMEN: NORMAL
IMM GRANULOCYTES # BLD AUTO: 0.05 10*3/MM3 (ref 0–0.05)
IMM GRANULOCYTES NFR BLD AUTO: 0.6 % (ref 0–0.5)
LYMPHOCYTES # BLD AUTO: 0.7 10*3/MM3 (ref 0.7–3.1)
LYMPHOCYTES NFR BLD AUTO: 8.1 % (ref 19.6–45.3)
MCH RBC QN AUTO: 27.7 PG (ref 26.6–33)
MCHC RBC AUTO-ENTMCNC: 30.3 G/DL (ref 31.5–35.7)
MCV RBC AUTO: 91.3 FL (ref 79–97)
MONOCYTES # BLD AUTO: 0.47 10*3/MM3 (ref 0.1–0.9)
MONOCYTES NFR BLD AUTO: 5.4 % (ref 5–12)
NEUTROPHILS NFR BLD AUTO: 7.28 10*3/MM3 (ref 1.7–7)
NEUTROPHILS NFR BLD AUTO: 84.3 % (ref 42.7–76)
NRBC BLD AUTO-RTO: 0 /100 WBC (ref 0–0.2)
PLATELET # BLD AUTO: 213 10*3/MM3 (ref 140–450)
PMV BLD AUTO: 9.1 FL (ref 6–12)
POTASSIUM SERPL-SCNC: 4.8 MMOL/L (ref 3.5–5.2)
PROT SERPL-MCNC: 6.3 G/DL (ref 6–8.5)
RBC # BLD AUTO: 3.11 10*6/MM3 (ref 4.14–5.8)
SODIUM SERPL-SCNC: 139 MMOL/L (ref 136–145)
WBC NRBC COR # BLD AUTO: 8.64 10*3/MM3 (ref 3.4–10.8)
WHOLE BLOOD HOLD COAG: NORMAL

## 2025-01-17 PROCEDURE — 85025 COMPLETE CBC W/AUTO DIFF WBC: CPT | Performed by: EMERGENCY MEDICINE

## 2025-01-17 PROCEDURE — 99283 EMERGENCY DEPT VISIT LOW MDM: CPT

## 2025-01-17 PROCEDURE — 80053 COMPREHEN METABOLIC PANEL: CPT | Performed by: EMERGENCY MEDICINE

## 2025-01-17 NOTE — ED PROVIDER NOTES
Time: 5:10 PM EST  Date of encounter:  1/17/2025  Independent Historian/Clinical History and Information was obtained by:   Patient  Chief Complaint: Bleeding from surgical site    History is limited by: N/A    History of Present Illness:  Patient is a 74 y.o. year old male who presents to the emergency department for evaluation of bleeding from his surgical site.  Patient reports he had a dialysis fistula placed on November 25.  On January 14 he had a procedure that moved the vein.  This was performed at [facility] by Dr. Tamez patient presents ER today due to his dressing being bloodied and concern for bleeding and swelling.  Patient reports this started yesterday.  Patient is not any blood thinners.    Patient Care Team  Primary Care Provider: Pedro Robles APRN    Past Medical History:     Allergies   Allergen Reactions    Nitroglycerin Other (See Comments)     Other reaction(s): Loss of Consciousness    Other reaction(s): Loss of Consciousness   Other reaction(s): Loss of Consciousness   Other reaction(s): Loss of Consciousness     Past Medical History:   Diagnosis Date    Cancer     Squamous cell skin    Cancer of kidney     Hypertension     Kidney failure      Past Surgical History:   Procedure Laterality Date    APPENDECTOMY      AV FISTULA PLACEMENT, BRACHIOCEPHALIC Left     1/14/2025    HERNIA REPAIR      NEPHRECTOMY Left 2014    PACEMAKER IMPLANTATION N/A 05/30/2024    Procedure: Implant or Replacement of Single Chamber Leadless Pacemaker, RV Only;  Surgeon: Ramirez De La O MD;  Location: [facility] INVASIVE LOCATION;  Service: Cardiovascular;  Laterality: N/A;    SMALL BOWEL EMBOLIZATION      TURP / TRANSURETHRAL INCISION / DRAINAGE PROSTATE       Family History   Problem Relation Age of Onset    Hypertension Mother     Hypertension Father     Heart attack Father     Heart attack Brother     Coronary artery disease Brother     Cancer Brother     Diabetes Brother     Aneurysm Paternal  Uncle     Cancer Maternal Grandmother        Home Medications:  Prior to Admission medications    Medication Sig Start Date End Date Taking? Authorizing Provider   Cariprazine HCl (VRAYLAR) 1.5 MG capsule capsule Take 1 capsule by mouth Daily. 6/17/24   Josh Montoya MD   Cariprazine HCl (VRAYLAR) 3 MG capsule capsule Take 1 capsule by mouth Daily. 9/12/24   Josh Montoya MD   Cholecalciferol 50 MCG (2000 UT) tablet Take 2 tablets by mouth Daily.    Josh Montoya MD   Cholecalciferol 50 MCG (2000 UT) tablet Take 2 tablets by mouth Daily.    Josh Montoya MD   cyanocobalamin 1000 MCG/ML injection Inject 1 mL into the appropriate muscle as directed by prescriber Every 30 (Thirty) Days. 12/7/23   Josh Montoya MD   desvenlafaxine (PRISTIQ) 50 MG 24 hr tablet Take 1 tablet by mouth Daily.    Josh Montoya MD   Ferrous Sulfate (IRON PO) Take  by mouth.    Josh Montoya MD   ferrous sulfate 325 (65 FE) MG tablet Take 1 tablet by mouth Daily With Breakfast. 3/28/24   Josh Montoya MD   HYDROcodone-acetaminophen (NORCO) 5-325 MG per tablet  11/25/24   Josh Montoya MD   lithium carbonate 150 MG capsule Take 1 capsule by mouth Daily. 10/15/24   Josh Montoya MD   Melatonin 10 MG tablet Take 1 tablet by mouth Daily.    Josh Montoya MD   Melatonin 10 MG tablet Take 1 tablet by mouth Daily.    Josh Montoya MD   metoprolol succinate XL (TOPROL-XL) 25 MG 24 hr tablet Take 1 tablet by mouth Daily. 2/6/24 2/5/25  Josh Montoya MD   NIFEdipine XL (PROCARDIA XL) 60 MG 24 hr tablet Take 1 tablet by mouth Daily. 12/6/23   Josh Montoya MD   polyethylene glycol (MIRALAX) 17 g packet Take 17 g by mouth Daily.    Josh Montoya MD   Polyethylene Glycol 3350 (MIRALAX PO) Take  by mouth.    Josh Montoya MD   tamsulosin (FLOMAX) 0.4 MG capsule 24 hr capsule Take 1 capsule by mouth Daily. 2/6/24   Lindsay  "Historical, MD        Social History:   Social History     Tobacco Use    Smoking status: Former     Current packs/day: 0.00     Average packs/day: 1 pack/day for 43.0 years (43.0 ttl pk-yrs)     Types: Cigarettes     Start date:      Quit date:      Years since quittin.0     Passive exposure: Never    Smokeless tobacco: Never   Vaping Use    Vaping status: Never Used   Substance Use Topics    Alcohol use: Not Currently    Drug use: Never         Review of Systems:  Review of Systems   Constitutional:  Negative for chills and fever.   HENT:  Negative for congestion, ear pain and sore throat.    Eyes:  Negative for pain.   Respiratory:  Negative for cough, chest tightness and shortness of breath.    Cardiovascular:  Negative for chest pain.   Gastrointestinal:  Negative for abdominal pain, diarrhea, nausea and vomiting.   Genitourinary:  Negative for flank pain and hematuria.   Musculoskeletal:  Negative for joint swelling.   Skin:  Negative for pallor.   Neurological:  Negative for seizures and headaches.   All other systems reviewed and are negative.       Physical Exam:  /66   Pulse 76   Temp 98 °F (36.7 °C) (Oral)   Resp 18   Ht 177.8 cm (70\")   Wt 62 kg (136 lb 11 oz)   SpO2 96%   BMI 19.61 kg/m²     Physical Exam    Vital signs were reviewed under triage note.  General appearance - Patient appears well-developed and well-nourished.  Patient is in no acute distress.  Head - Normocephalic, atraumatic.  Pupils - Equal, round, reactive to light.  Extraocular muscles are intact.  Conjunctiva is clear.  Nasal - Normal inspection.  No evidence of trauma or epistaxis.  Tympanic membranes - Gray, intact without erythema or retractions.  Oral mucosa - Pink and moist without lesions or erythema.  Uvula is midline.  Chest wall - Atraumatic.  Chest wall is nontender.  There are no vesicular rashes noted.  Neck - Supple.  Trachea was midline.  There is no palpable lymphadenopathy or thyromegaly.  " There are no meningeal signs  Lungs - Clear to auscultation and percussion bilaterally.  Heart - Regular rate and rhythm without any murmurs, clicks, or gallops.  Abdomen - Soft.  Bowel sounds are present.  There is no palpable tenderness.  There is no rebound, guarding, or rigidity.  There are no palpable masses.  There are no pulsatile masses.  Back - Spine is straight and midline.  There is no CVA tenderness.  Extremities - Intact x4 with full range of motion.  There is no palpable edema.  Pulses are intact x4 and equal.  Patient has a surgical scar involving his left upper humeral region on the medial aspect.  Dressing was reddish in color it was not wet.  The dressing was taken down revealing surgical incision is well-approximated.  This was left open to evaluate for active bleeding.  Over the period of an hour there was some blood-tinged serosanguineous drainage near the elbow region but no active signs of any active bleeding.  There is some mild soft tissue swelling noted.  There is no erythema or mucopurulent drainage.  Neurologic - Patient is awake, alert, and oriented x3.  Cranial nerves II through XII are grossly intact.  Motor and sensory functions grossly intact.  Cerebellar function was normal.  Integument - There are no rashes.  There are no petechia or purpura lesions noted.  There are no vesicular lesions noted.           Procedures:  Procedures      Medical Decision Making:      Comorbidities that affect care:    Hypertension, end-stage renal disease    External Notes reviewed:    Previous Operation Note: Op note from 1/14/2025 was reviewed by me.      The following orders were placed and all results were independently analyzed by me:  Orders Placed This Encounter   Procedures    Comprehensive Metabolic Panel    CBC Auto Differential    IP General Consult (Use specialty-specific consult if known)    CBC & Differential    Extra Tubes    Gold Top - SST    Light Blue Top       Medications Given in the  Emergency Department:  Medications - No data to display     ED Course:     The patient was seen and evaluated in the ED by me.  The above history and physical examination was performed as documented.  Diagnostic data was obtained.  Results reviewed.  I consult with Dr. Pride.  The patient's arm is not actively bleeding.  There is also no active cellulitis.  The patient does have some blood-tinged serosanguineous seeping from the elbow portion of the incision.  Dr. Pride felt patient can be discharged home with follow-up in the office as scheduled.  New dressing was applied by nursing staff prior to discharge.    Labs:    Lab Results (last 24 hours)       Procedure Component Value Units Date/Time    Comprehensive Metabolic Panel [[id number]]  (Abnormal) Collected: 01/17/25 1511    Specimen: Blood Updated: 01/17/25 1555     Glucose 98 mg/dL      BUN 66 mg/dL      Creatinine 4.89 mg/dL      Sodium 139 mmol/L      Potassium 4.8 mmol/L      Chloride 108 mmol/L      CO2 20.0 mmol/L      Calcium 9.2 mg/dL      Total Protein 6.3 g/dL      Albumin 3.7 g/dL      ALT (SGPT) <5 U/L      AST (SGOT) 8 U/L      Alkaline Phosphatase 43 U/L      Total Bilirubin 0.2 mg/dL      Globulin 2.6 gm/dL      A/G Ratio 1.4 g/dL      BUN/Creatinine Ratio 13.5     Anion Gap 11.0 mmol/L      eGFR 11.8 mL/min/1.73     Narrative:      GFR Categories in Chronic Kidney Disease (CKD)      GFR Category          GFR (mL/min/1.73)    Interpretation  G1                     90 or greater         Normal or high (1)  G2                      60-89                Mild decrease (1)  G3a                   45-59                Mild to moderate decrease  G3b                   30-44                Moderate to severe decrease  G4                    15-29                Severe decrease  G5                    14 or less           Kidney failure          (1)In the absence of evidence of kidney disease, neither GFR category G1 or G2 fulfill the criteria for  CKD.    eGFR calculation 2021 CKD-EPI creatinine equation, which does not include race as a factor    CBC & Differential [[id number]]  (Abnormal) Collected: 01/17/25 1511    Specimen: Blood Updated: 01/17/25 1521    Narrative:      The following orders were created for panel order CBC & Differential.  Procedure                               Abnormality         Status                     ---------                               -----------         ------                     CBC Auto Differential[[id number]]        Abnormal            Final result                 Please view results for these tests on the individual orders.    CBC Auto Differential [[id number]]  (Abnormal) Collected: 01/17/25 1511    Specimen: Blood Updated: 01/17/25 1521     WBC 8.64 10*3/mm3      RBC 3.11 10*6/mm3      Hemoglobin 8.6 g/dL      Hematocrit 28.4 %      MCV 91.3 fL      MCH 27.7 pg      MCHC 30.3 g/dL      RDW 14.8 %      RDW-SD 49.5 fl      MPV 9.1 fL      Platelets 213 10*3/mm3      Neutrophil % 84.3 %      Lymphocyte % 8.1 %      Monocyte % 5.4 %      Eosinophil % 1.5 %      Basophil % 0.1 %      Immature Grans % 0.6 %      Neutrophils, Absolute 7.28 10*3/mm3      Lymphocytes, Absolute 0.70 10*3/mm3      Monocytes, Absolute 0.47 10*3/mm3      Eosinophils, Absolute 0.13 10*3/mm3      Basophils, Absolute 0.01 10*3/mm3      Immature Grans, Absolute 0.05 10*3/mm3      nRBC 0.0 /100 WBC              Imaging:    No Radiology Exams Resulted Within Past 24 Hours      Differential Diagnosis and Discussion:    Wound Evaluation: Differential diagnosis includes but is not limited to laceration, abrasion, puncture, burn, ulcer, cellulitis, abscess, vasculitis, malignancy, and rash.    Labs were collected in the emergency department and all labs were reviewed and interpreted by me.    [facility]           Patient Care Considerations:    SEPSIS was considered but is NOT present in the emergency department as SIRS criteria is not  present.      Consultants/Shared Management Plan:    I have discussed the case with Dr. Tamez who states that the patient can be safely discharged with close follow up.    Social Determinants of Health:    Patient is independent, reliable, and has access to care.       Disposition and Care Coordination:    Discharged: The patient is suitable and stable for discharge with no need for consideration of admission.    I have explained the patient´s condition, diagnoses and treatment plan based on the information available to me at this time. I have answered questions and addressed any concerns. The patient has a good  understanding of the patient´s diagnosis, condition, and treatment plan as can be expected at this point. The vital signs have been stable. The patient´s condition is stable and appropriate for discharge from the emergency department.      The patient will pursue further outpatient evaluation with the primary care physician or other designated or consulting physician as outlined in the discharge instructions. They are agreeable to this plan of care and follow-up instructions have been explained in detail. The patient has received these instructions in written format and has expressed an understanding of the discharge instructions. The patient is aware that any significant change in condition or worsening of symptoms should prompt an immediate return to this or the closest emergency department or call to 911.    Final diagnoses:   Encounter for post surgical wound check        ED Disposition       ED Disposition   Discharge    Condition   Stable    Comment   --               This medical record created using voice recognition software.             Jan Quijano DO  01/18/25 2882

## 2025-01-17 NOTE — DISCHARGE INSTRUCTIONS
Change dressing as needed when soiled.  Continue to follow postsurgical instructions.  Continue your home medications as prescribed.  Follow-up with Dr. Pride as scheduled.  Return to the ER for pain, increased swelling, increased redness, or any other concerns issues that may arise.

## 2025-01-29 ENCOUNTER — CLINICAL SUPPORT NO REQUIREMENTS (OUTPATIENT)
Age: 75
End: 2025-01-29
Payer: MEDICARE

## 2025-01-29 ENCOUNTER — OFFICE VISIT (OUTPATIENT)
Age: 75
End: 2025-01-29
Payer: MEDICARE

## 2025-01-29 VITALS
WEIGHT: 133 LBS | HEIGHT: 70 IN | SYSTOLIC BLOOD PRESSURE: 118 MMHG | DIASTOLIC BLOOD PRESSURE: 70 MMHG | BODY MASS INDEX: 19.04 KG/M2 | HEART RATE: 66 BPM

## 2025-01-29 DIAGNOSIS — I45.5 SINUS PAUSE: Primary | ICD-10-CM

## 2025-01-29 DIAGNOSIS — I45.2 BIFASCICULAR BLOCK: ICD-10-CM

## 2025-01-29 DIAGNOSIS — Z95.0 PRESENCE OF CARDIAC PACEMAKER: ICD-10-CM

## 2025-01-29 PROCEDURE — 3078F DIAST BP <80 MM HG: CPT | Performed by: INTERNAL MEDICINE

## 2025-01-29 PROCEDURE — 93000 ELECTROCARDIOGRAM COMPLETE: CPT | Performed by: INTERNAL MEDICINE

## 2025-01-29 PROCEDURE — 99214 OFFICE O/P EST MOD 30 MIN: CPT | Performed by: INTERNAL MEDICINE

## 2025-01-29 PROCEDURE — 3074F SYST BP LT 130 MM HG: CPT | Performed by: INTERNAL MEDICINE

## 2025-01-29 NOTE — PROGRESS NOTES
Date of Office Visit: 2025  Encounter Provider: Ramirez De La O MD  Place of Service: [facility] CARDIOLOGY  Patient Name: Victor M Grove  : 1950    Subjective:     Encounter Date:2025      Patient ID: Victor M Grove is a 74 y.o. male who has a cc of  CKD and recent fisturla surgery    I did leadless pacer in May 24 for 12 sec pauses.     Lots of problems but here for pacer check.     Syncope that he had is gone.     Fatigue and cornejo and soa and weakness   There have been no hospital admission since the last visit.     There have been no bleeding events.       Past Medical History:   Diagnosis Date    Cancer     Squamous cell skin    Cancer of kidney     Hypertension     Kidney failure        Social History     Socioeconomic History    Marital status:    Tobacco Use    Smoking status: Former     Current packs/day: 0.00     Average packs/day: 1 pack/day for 43.0 years (43.0 ttl pk-yrs)     Types: Cigarettes     Start date:      Quit date:      Years since quittin.0     Passive exposure: Never    Smokeless tobacco: Never   Vaping Use    Vaping status: Never Used   Substance and Sexual Activity    Alcohol use: Not Currently    Drug use: Never       Family History   Problem Relation Age of Onset    Hypertension Mother     Hypertension Father     Heart attack Father     Heart attack Brother     Coronary artery disease Brother     Cancer Brother     Diabetes Brother     Aneurysm Paternal Uncle     Cancer Maternal Grandmother        Review of Systems   Constitutional: Negative for fever and night sweats.   HENT:  Negative for ear pain and stridor.    Eyes:  Negative for discharge and visual halos.   Cardiovascular:  Negative for cyanosis.   Respiratory:  Negative for hemoptysis and sputum production.    Hematologic/Lymphatic: Negative for adenopathy.   Skin:  Negative for nail changes and unusual hair distribution.   Musculoskeletal:  Positive for arthritis and joint  "pain. Negative for gout and joint swelling.   Gastrointestinal:  Negative for bowel incontinence and flatus.   Genitourinary:  Negative for dysuria and flank pain.   Neurological:  Positive for loss of balance and weakness. Negative for seizures and tremors.   Psychiatric/Behavioral:  Negative for altered mental status. The patient is not nervous/anxious.             Objective:     Vitals:    01/29/25 1034   BP: 118/70   BP Location: Right arm   Patient Position: Sitting   Pulse: 66   Weight: 60.3 kg (133 lb)   Height: 177.8 cm (70\")         Eyes:      General:         Right eye: No discharge.         Left eye: No discharge.   HENT:      Head: Normocephalic and atraumatic.   Neck:      Thyroid: No thyromegaly.      Vascular: No JVD.   Pulmonary:      Effort: Pulmonary effort is normal.      Breath sounds: Normal breath sounds. No rales.   Cardiovascular:      Normal rate. Regular rhythm.      No gallop.    Edema:     Peripheral edema absent.   Abdominal:      General: Bowel sounds are normal.      Palpations: Abdomen is soft.      Tenderness: There is no abdominal tenderness.   Musculoskeletal: Normal range of motion.         General: No deformity. Skin:     General: Skin is warm and dry.      Findings: No erythema.   Neurological:      Mental Status: Alert and oriented to person, place, and time.      Motor: Normal muscle tone.   Psychiatric:         Behavior: Behavior normal.         Thought Content: Thought content normal.           ECG 12 Lead    Date/Time: 1/29/2025 10:58 AM  Performed by: Ramirez De La O MD    Authorized by: Ramirez De La O MD  Comparison: compared with previous ECG   Similar to previous ECG  Rhythm: sinus rhythm  Conduction: left bundle branch block and left anterior fascicular block          Lab Review:       Assessment:          Diagnosis Plan   1. Sinus pause        2. Bifascicular block        3. Presence of cardiac pacemaker               Plan:       I reviewed the pacemaker/ICD tracings " and the pacing and sensing parameters are abnormal -- RV threshold is a bit high. But hanging in there.   He paces infrequently so I think it is ok.

## 2025-02-03 ENCOUNTER — CLINICAL SUPPORT (OUTPATIENT)
Dept: UROLOGY | Age: 75
End: 2025-02-03
Payer: MEDICARE

## 2025-02-03 DIAGNOSIS — R33.9 URINARY RETENTION: Primary | ICD-10-CM

## 2025-02-05 RX ORDER — FAMOTIDINE 20 MG/1
40 TABLET, FILM COATED ORAL
COMMUNITY
Start: 2025-01-27

## 2025-02-05 RX ORDER — PANTOPRAZOLE SODIUM 40 MG/1
40 TABLET, DELAYED RELEASE ORAL
COMMUNITY
Start: 2025-01-22 | End: 2025-02-21

## 2025-02-05 NOTE — PROGRESS NOTES
Procedure   Cath Change, Simple    Date/Time: 2/5/2025 2:14 PM    Performed by: Irma Mcclelland RN  Authorized by: Elana Monterroso MD  Preparation: Patient was prepped and draped in the usual sterile fashion.  Local anesthesia used: no    Anesthesia:  Local anesthesia used: no    Sedation:  Patient sedated: no    Patient tolerance: patient tolerated the procedure well with no immediate complications  Comments: Patient presented to office for a catheter change.  Patient in supine position.  Deflated balloon on existing catheter and removed without concerns.  Using sterile technique cleansed genitalia, inserted a 18 Venezuelan straight silicone catheter, instilled 5-10 cc of sterile water into balloon, attached catheter bag with urine return.  Patient tolerated well.

## 2025-03-03 ENCOUNTER — CLINICAL SUPPORT (OUTPATIENT)
Dept: UROLOGY | Age: 75
End: 2025-03-03
Payer: MEDICARE

## 2025-03-03 DIAGNOSIS — R33.9 URINARY RETENTION: Primary | ICD-10-CM

## 2025-03-03 PROCEDURE — 51702 INSERT TEMP BLADDER CATH: CPT | Performed by: UROLOGY

## 2025-03-03 NOTE — PROGRESS NOTES
Procedure   Cath Change, Simple    Date/Time: 3/3/2025 2:22 PM    Performed by: Irma Mcclelland RN  Authorized by: Elana Monterroso MD  Preparation: Patient was prepped and draped in the usual sterile fashion.  Local anesthesia used: no    Anesthesia:  Local anesthesia used: no    Sedation:  Patient sedated: no    Patient tolerance: patient tolerated the procedure well with no immediate complications  Comments: Patient presented to office for a catheter change.  Patient in supine position.  Deflated balloon on existing catheter and removed without concerns.  Using sterile technique cleansed genitalia, inserted a 18 Marshallese silicone straight catheter, instilled 5-10 cc of sterile water into balloon, attached catheter bag with urine return.  Patient tolerated well.

## 2025-04-07 ENCOUNTER — CLINICAL SUPPORT (OUTPATIENT)
Dept: UROLOGY | Age: 75
End: 2025-04-07
Payer: MEDICARE

## 2025-04-07 DIAGNOSIS — Z90.5 SOLITARY KIDNEY, ACQUIRED: ICD-10-CM

## 2025-04-07 DIAGNOSIS — R33.9 URINARY RETENTION: Primary | ICD-10-CM

## 2025-04-07 PROCEDURE — 51702 INSERT TEMP BLADDER CATH: CPT | Performed by: UROLOGY

## 2025-04-07 NOTE — PROGRESS NOTES
Procedure   Cath Change, Simple    Date/Time: 4/7/2025 11:55 AM    Performed by: Irma Mcclelland RN  Authorized by: Elana Monterroso MD  Preparation: Patient was prepped and draped in the usual sterile fashion.  Local anesthesia used: no    Anesthesia:  Local anesthesia used: no    Sedation:  Patient sedated: no    Patient tolerance: patient tolerated the procedure well with no immediate complications  Comments: Patient presented to office for a catheter change.  Patient in supine position.  Deflated balloon of 5/10ml on existing catheter and removed without concerns.  Using sterile technique cleansed genitalia, inserted a 18 Kinyarwanda silicone straight wei catheter, instilled 5-10 ml of sterile water into balloon, attached catheter LEG bag with urine return.  Patient tolerated well. Pt likes leg bags only (1) and a lot of securement devices in go bag.

## 2025-05-05 ENCOUNTER — CLINICAL SUPPORT NO REQUIREMENTS (OUTPATIENT)
Age: 75
End: 2025-05-05
Payer: MEDICARE

## 2025-05-05 DIAGNOSIS — I45.5 SINUS PAUSE: Primary | ICD-10-CM

## 2025-05-12 ENCOUNTER — TELEPHONE (OUTPATIENT)
Dept: UROLOGY | Age: 75
End: 2025-05-12
Payer: MEDICARE

## 2025-05-12 NOTE — TELEPHONE ENCOUNTER
PATIENT'S WIFE CALLED AND SAID SHE NEEDS TO RESCHEDULE THE NURSE VISIT TODAY AT 11:30 TO FRIDAY.    #[phone]

## 2025-05-16 ENCOUNTER — CLINICAL SUPPORT (OUTPATIENT)
Dept: UROLOGY | Age: 75
End: 2025-05-16
Payer: MEDICARE

## 2025-05-16 VITALS — BODY MASS INDEX: 19.04 KG/M2 | HEIGHT: 70 IN | WEIGHT: 133 LBS | RESPIRATION RATE: 16 BRPM

## 2025-05-16 DIAGNOSIS — R33.9 URINARY RETENTION: Primary | ICD-10-CM

## 2025-05-16 NOTE — PROGRESS NOTES
Procedure   Cath Change, Simple    Date/Time: 5/16/2025 1:29 PM    Performed by: Miracle Jeong RN  Authorized by: Elana Monterroso MD  Preparation: Patient was prepped and draped in the usual sterile fashion.  Local anesthesia used: no    Anesthesia:  Local anesthesia used: no    Sedation:  Patient sedated: no    Comments: Patient presented to office for a catheter change.  Patient in supine position.  Deflated balloon of 9cc fuild on existing catheter and removed without concerns.  Using sterile technique cleansed genitalia, inserted a 18 silicone straight Belgian, instilled 10 cc of sterile water into balloon, attached catheter leg bag with urine return.  Patient tolerated well. Patient was given supplies that included leg bag and extra wei cath securers.  Appointment was given for a 4 week cath change

## 2025-06-16 ENCOUNTER — TELEPHONE (OUTPATIENT)
Dept: UROLOGY | Age: 75
End: 2025-06-16
Payer: MEDICARE

## 2025-06-16 ENCOUNTER — CLINICAL SUPPORT (OUTPATIENT)
Dept: UROLOGY | Age: 75
End: 2025-06-16
Payer: MEDICARE

## 2025-06-16 DIAGNOSIS — R33.9 URINARY RETENTION: Primary | ICD-10-CM

## 2025-06-16 PROCEDURE — 51702 INSERT TEMP BLADDER CATH: CPT | Performed by: UROLOGY

## 2025-06-16 NOTE — TELEPHONE ENCOUNTER
Call made to pt to inform changed cath change to 7/14 @ 1130; no ans; LVM; if calls back can inform them of appt time and day change per their request.

## 2025-06-16 NOTE — TELEPHONE ENCOUNTER
Hub staff attempted to follow warm transfer process and was unsuccessful     Caller: KIARA GO    Relationship to patient: SELF    Best call back number: [phone]    Patient is needing: PT NEEDING TO CHANGE NEXT CATH CHANGE VISIT TO 7/14/25. PLEASE CALL PT BACK TO ADVISE ON APPT. THANK YOU.

## 2025-06-16 NOTE — PROGRESS NOTES
Procedure   Cath Change, Simple    Date/Time: 6/16/2025 1:35 PM    Performed by: Irma Mcclelland RN  Authorized by: Elana Monterroso MD  Preparation: Patient was prepped and draped in the usual sterile fashion.  Local anesthesia used: no    Anesthesia:  Local anesthesia used: no    Sedation:  Patient sedated: no    Patient tolerance: patient tolerated the procedure well with no immediate complications  Comments: Patient presented to office for a catheter change.  Patient in supine position.  Deflated balloon on existing catheter and removed without concerns.  Using sterile technique cleansed genitalia, inserted a 18 Spanish SILICONE STRAIGHT wei catheter, instilled 5-10 ml of sterile water into balloon, attached catheter bag with urine return.  Patient tolerated well. Likes 1 leg bag and a lot of stat locks in his to-go bag.

## 2025-07-14 ENCOUNTER — CLINICAL SUPPORT (OUTPATIENT)
Dept: UROLOGY | Age: 75
End: 2025-07-14
Payer: MEDICARE

## 2025-07-14 VITALS — RESPIRATION RATE: 16 BRPM

## 2025-07-14 DIAGNOSIS — R33.9 URINARY RETENTION: Primary | ICD-10-CM

## 2025-07-14 PROCEDURE — 51702 INSERT TEMP BLADDER CATH: CPT | Performed by: UROLOGY

## 2025-07-14 RX ORDER — LANOLIN ALCOHOL/MO/W.PET/CERES
CREAM (GRAM) TOPICAL
COMMUNITY
Start: 2025-02-19

## 2025-07-14 RX ORDER — SODIUM BICARBONATE 650 MG/1
1 TABLET ORAL
COMMUNITY
Start: 2025-02-25

## 2025-07-14 RX ORDER — SUCROFERRIC OXYHYDROXIDE 500 MG/1
TABLET, CHEWABLE ORAL
COMMUNITY
Start: 2025-06-02

## 2025-07-14 RX ORDER — LITHIUM CARBONATE 300 MG/1
TABLET, FILM COATED, EXTENDED RELEASE ORAL
COMMUNITY
Start: 2025-05-27

## 2025-07-14 RX ORDER — LACTULOSE 10 G/15ML
15 SOLUTION ORAL; RECTAL DAILY
COMMUNITY
Start: 2025-03-18

## 2025-07-29 ENCOUNTER — TELEPHONE (OUTPATIENT)
Dept: CARDIOLOGY | Age: 75
End: 2025-07-29

## 2025-08-08 LAB
MDC_IDC_MSMT_BATTERY_REMAINING_LONGEVITY: 15 MO
MDC_IDC_MSMT_BATTERY_RRT_TRIGGER: 2.58
MDC_IDC_MSMT_BATTERY_VOLTAGE: 2.99
MDC_IDC_MSMT_LEADCHNL_RA_SENSING_INTR_AMPL: 1.1
MDC_IDC_MSMT_LEADCHNL_RV_DTM: NORMAL
MDC_IDC_MSMT_LEADCHNL_RV_IMPEDANCE_VALUE: 480
MDC_IDC_MSMT_LEADCHNL_RV_PACING_THRESHOLD_AMPLITUDE: 1.63
MDC_IDC_MSMT_LEADCHNL_RV_PACING_THRESHOLD_PULSEWIDTH: 0.4
MDC_IDC_PG_MFG: NORMAL
MDC_IDC_PG_MODEL: NORMAL
MDC_IDC_PG_SERIAL: NORMAL
MDC_IDC_PG_TYPE: NORMAL
MDC_IDC_SESS_DTM: NORMAL
MDC_IDC_SESS_TYPE: NORMAL
MDC_IDC_SET_BRADY_LOWRATE: 50
MDC_IDC_SET_BRADY_MAX_SENSOR_RATE: 120
MDC_IDC_SET_BRADY_MAX_TRACKING_RATE: 120
MDC_IDC_SET_BRADY_MODE: NORMAL
MDC_IDC_SET_BRADY_SAV_DELAY: 20
MDC_IDC_SET_LEADCHNL_RV_PACING_AMPLITUDE: 4
MDC_IDC_SET_LEADCHNL_RV_PACING_PULSEWIDTH: 0.4
MDC_IDC_SET_LEADCHNL_RV_SENSING_SENSITIVITY: 2
MDC_IDC_STAT_BRADY_RV_PERCENT_PACED: 93.61

## 2025-08-25 ENCOUNTER — CLINICAL SUPPORT (OUTPATIENT)
Dept: UROLOGY | Age: 75
End: 2025-08-25
Payer: MEDICARE

## 2025-08-25 DIAGNOSIS — R33.9 URINARY RETENTION: Primary | ICD-10-CM

## (undated) DEVICE — SHEATH INTRO MICRA HC 23F 55.7CM

## (undated) DEVICE — INTRO SHEATH TRNSEP .032 SL0 8.5F 63CM

## (undated) DEVICE — GW AMPLATZ SUPERSTIFF 3MM J/TP .035IN 145CM

## (undated) DEVICE — Device

## (undated) DEVICE — PINNACLE INTRODUCER SHEATH: Brand: PINNACLE

## (undated) DEVICE — LOU EP: Brand: MEDLINE INDUSTRIES, INC.

## (undated) DEVICE — RADIFOCUS GLIDEWIRE: Brand: GLIDEWIRE

## (undated) DEVICE — DIL COONS/TPR .038IN 22F 20CM